# Patient Record
Sex: MALE | Race: WHITE | NOT HISPANIC OR LATINO | Employment: OTHER | URBAN - METROPOLITAN AREA
[De-identification: names, ages, dates, MRNs, and addresses within clinical notes are randomized per-mention and may not be internally consistent; named-entity substitution may affect disease eponyms.]

---

## 2020-04-15 ENCOUNTER — HOSPITAL ENCOUNTER (OUTPATIENT)
Dept: RADIOLOGY | Facility: HOSPITAL | Age: 65
Discharge: HOME | End: 2020-04-15
Attending: INTERNAL MEDICINE
Payer: MEDICARE

## 2020-04-15 ENCOUNTER — TRANSCRIBE ORDERS (OUTPATIENT)
Dept: SCHEDULING | Age: 65
End: 2020-04-15

## 2020-04-15 DIAGNOSIS — M47.816 SPONDYLOSIS WITHOUT MYELOPATHY OR RADICULOPATHY, LUMBAR REGION: ICD-10-CM

## 2020-04-15 DIAGNOSIS — M47.814 SPONDYLOSIS WITHOUT MYELOPATHY OR RADICULOPATHY, THORACIC REGION: Primary | ICD-10-CM

## 2020-04-15 DIAGNOSIS — M47.814 SPONDYLOSIS WITHOUT MYELOPATHY OR RADICULOPATHY, THORACIC REGION: ICD-10-CM

## 2020-04-15 PROCEDURE — 72070 X-RAY EXAM THORAC SPINE 2VWS: CPT

## 2020-04-15 PROCEDURE — 72100 X-RAY EXAM L-S SPINE 2/3 VWS: CPT

## 2020-06-22 ENCOUNTER — TRANSCRIBE ORDERS (OUTPATIENT)
Dept: SCHEDULING | Age: 65
End: 2020-06-22

## 2020-06-22 DIAGNOSIS — M47.814 SPONDYLOSIS WITHOUT MYELOPATHY OR RADICULOPATHY, THORACIC REGION: Primary | ICD-10-CM

## 2020-06-25 ENCOUNTER — HOSPITAL ENCOUNTER (OUTPATIENT)
Dept: RADIOLOGY | Facility: HOSPITAL | Age: 65
Discharge: HOME | End: 2020-06-25
Attending: INTERNAL MEDICINE
Payer: MEDICARE

## 2020-06-25 DIAGNOSIS — M47.814 SPONDYLOSIS WITHOUT MYELOPATHY OR RADICULOPATHY, THORACIC REGION: ICD-10-CM

## 2020-09-17 ENCOUNTER — APPOINTMENT (RX ONLY)
Dept: URBAN - METROPOLITAN AREA CLINIC 374 | Facility: CLINIC | Age: 65
Setting detail: DERMATOLOGY
End: 2020-09-17

## 2020-09-17 DIAGNOSIS — L64.8 OTHER ANDROGENIC ALOPECIA: ICD-10-CM

## 2020-09-17 DIAGNOSIS — L81.4 OTHER MELANIN HYPERPIGMENTATION: ICD-10-CM

## 2020-09-17 DIAGNOSIS — L20.89 OTHER ATOPIC DERMATITIS: ICD-10-CM

## 2020-09-17 DIAGNOSIS — D485 NEOPLASM OF UNCERTAIN BEHAVIOR OF SKIN: ICD-10-CM

## 2020-09-17 DIAGNOSIS — L82.1 OTHER SEBORRHEIC KERATOSIS: ICD-10-CM

## 2020-09-17 DIAGNOSIS — D22 MELANOCYTIC NEVI: ICD-10-CM

## 2020-09-17 DIAGNOSIS — Z71.89 OTHER SPECIFIED COUNSELING: ICD-10-CM

## 2020-09-17 DIAGNOSIS — D18.0 HEMANGIOMA: ICD-10-CM

## 2020-09-17 PROBLEM — D22.5 MELANOCYTIC NEVI OF TRUNK: Status: ACTIVE | Noted: 2020-09-17

## 2020-09-17 PROBLEM — D48.5 NEOPLASM OF UNCERTAIN BEHAVIOR OF SKIN: Status: ACTIVE | Noted: 2020-09-17

## 2020-09-17 PROBLEM — D18.01 HEMANGIOMA OF SKIN AND SUBCUTANEOUS TISSUE: Status: ACTIVE | Noted: 2020-09-17

## 2020-09-17 PROCEDURE — ? PHOTO-DOCUMENTATION

## 2020-09-17 PROCEDURE — ? BIOPSY BY SHAVE METHOD

## 2020-09-17 PROCEDURE — ? FULL BODY SKIN EXAM

## 2020-09-17 PROCEDURE — 11102 TANGNTL BX SKIN SINGLE LES: CPT

## 2020-09-17 PROCEDURE — ? PRESCRIPTION

## 2020-09-17 PROCEDURE — ? PRESCRIPTION MEDICATION MANAGEMENT

## 2020-09-17 PROCEDURE — ? COUNSELING

## 2020-09-17 PROCEDURE — 99214 OFFICE O/P EST MOD 30 MIN: CPT | Mod: 25

## 2020-09-17 RX ORDER — TRIAMCINOLONE ACETONIDE 1 MG/G
CREAM TOPICAL
Qty: 1 | Refills: 3 | Status: ERX | COMMUNITY
Start: 2020-09-17

## 2020-09-17 RX ORDER — FINASTERIDE 1 MG/1
TABLET, FILM COATED ORAL QD
Qty: 360 | Refills: 1

## 2020-09-17 RX ADMIN — TRIAMCINOLONE ACETONIDE: 1 CREAM TOPICAL at 00:00

## 2020-09-17 ASSESSMENT — LOCATION DETAILED DESCRIPTION DERM
LOCATION DETAILED: LEFT ANTERIOR PROXIMAL UPPER ARM
LOCATION DETAILED: LEFT ANTERIOR PROXIMAL THIGH
LOCATION DETAILED: RIGHT LATERAL SUPERIOR CHEST
LOCATION DETAILED: LEFT ANTERIOR SHOULDER
LOCATION DETAILED: LEFT LATERAL SUPERIOR CHEST
LOCATION DETAILED: RIGHT INFERIOR MEDIAL UPPER BACK
LOCATION DETAILED: LEFT PROXIMAL CALF
LOCATION DETAILED: POSTERIOR MID-PARIETAL SCALP
LOCATION DETAILED: EPIGASTRIC SKIN

## 2020-09-17 ASSESSMENT — LOCATION SIMPLE DESCRIPTION DERM
LOCATION SIMPLE: LEFT THIGH
LOCATION SIMPLE: ABDOMEN
LOCATION SIMPLE: RIGHT UPPER BACK
LOCATION SIMPLE: CHEST
LOCATION SIMPLE: LEFT SHOULDER
LOCATION SIMPLE: LEFT UPPER ARM
LOCATION SIMPLE: POSTERIOR SCALP
LOCATION SIMPLE: LEFT CALF

## 2020-09-17 ASSESSMENT — LOCATION ZONE DERM
LOCATION ZONE: TRUNK
LOCATION ZONE: ARM
LOCATION ZONE: SCALP
LOCATION ZONE: LEG

## 2020-09-17 NOTE — PROCEDURE: PRESCRIPTION MEDICATION MANAGEMENT
Continue Regimen: Propecia 1mg tablets qday
Detail Level: Zone
Render In Strict Bullet Format?: No
Initiate Treatment: triamcinolone acetonide 0.1 % topical cream: Apply to affected area of chest and arms bid ( do not use on face)

## 2020-09-17 NOTE — PROCEDURE: BIOPSY BY SHAVE METHOD

## 2021-03-18 ENCOUNTER — APPOINTMENT (RX ONLY)
Dept: URBAN - METROPOLITAN AREA CLINIC 374 | Facility: CLINIC | Age: 66
Setting detail: DERMATOLOGY
End: 2021-03-18

## 2021-03-18 DIAGNOSIS — L82.1 OTHER SEBORRHEIC KERATOSIS: ICD-10-CM

## 2021-03-18 DIAGNOSIS — Z71.89 OTHER SPECIFIED COUNSELING: ICD-10-CM

## 2021-03-18 DIAGNOSIS — L81.4 OTHER MELANIN HYPERPIGMENTATION: ICD-10-CM

## 2021-03-18 DIAGNOSIS — D22 MELANOCYTIC NEVI: ICD-10-CM

## 2021-03-18 DIAGNOSIS — D18.0 HEMANGIOMA: ICD-10-CM

## 2021-03-18 DIAGNOSIS — L57.0 ACTINIC KERATOSIS: ICD-10-CM

## 2021-03-18 PROBLEM — D18.01 HEMANGIOMA OF SKIN AND SUBCUTANEOUS TISSUE: Status: ACTIVE | Noted: 2021-03-18

## 2021-03-18 PROBLEM — D48.5 NEOPLASM OF UNCERTAIN BEHAVIOR OF SKIN: Status: ACTIVE | Noted: 2021-03-18

## 2021-03-18 PROBLEM — D22.5 MELANOCYTIC NEVI OF TRUNK: Status: ACTIVE | Noted: 2021-03-18

## 2021-03-18 PROCEDURE — ? COUNSELING

## 2021-03-18 PROCEDURE — 11102 TANGNTL BX SKIN SINGLE LES: CPT

## 2021-03-18 PROCEDURE — ? FULL BODY SKIN EXAM

## 2021-03-18 PROCEDURE — ? SUNSCREEN RECOMMENDATIONS

## 2021-03-18 PROCEDURE — ? PHOTO-DOCUMENTATION

## 2021-03-18 PROCEDURE — 17000 DESTRUCT PREMALG LESION: CPT | Mod: 59

## 2021-03-18 PROCEDURE — 17003 DESTRUCT PREMALG LES 2-14: CPT

## 2021-03-18 PROCEDURE — 99213 OFFICE O/P EST LOW 20 MIN: CPT | Mod: 25

## 2021-03-18 PROCEDURE — ? PREMALIGNANT DESTRUCTION

## 2021-03-18 PROCEDURE — ? BIOPSY BY SHAVE METHOD

## 2021-03-18 ASSESSMENT — LOCATION SIMPLE DESCRIPTION DERM
LOCATION SIMPLE: LEFT SCALP
LOCATION SIMPLE: SCALP
LOCATION SIMPLE: CHEST
LOCATION SIMPLE: RIGHT SCALP
LOCATION SIMPLE: LEFT BREAST
LOCATION SIMPLE: RIGHT UPPER BACK
LOCATION SIMPLE: ABDOMEN
LOCATION SIMPLE: LEFT THIGH

## 2021-03-18 ASSESSMENT — LOCATION DETAILED DESCRIPTION DERM
LOCATION DETAILED: RIGHT LATERAL SUPERIOR CHEST
LOCATION DETAILED: LEFT INFRAMAMMARY CREASE (OUTER QUADRANT)
LOCATION DETAILED: LEFT CENTRAL FRONTAL SCALP
LOCATION DETAILED: LEFT SUPERIOR PARIETAL SCALP
LOCATION DETAILED: RIGHT SUPERIOR PARIETAL SCALP
LOCATION DETAILED: LEFT CENTRAL PARIETAL SCALP
LOCATION DETAILED: RIGHT CENTRAL FRONTAL SCALP
LOCATION DETAILED: EPIGASTRIC SKIN
LOCATION DETAILED: RIGHT INFERIOR MEDIAL UPPER BACK
LOCATION DETAILED: LEFT ANTERIOR PROXIMAL THIGH

## 2021-03-18 ASSESSMENT — LOCATION ZONE DERM
LOCATION ZONE: LEG
LOCATION ZONE: TRUNK
LOCATION ZONE: SCALP

## 2021-03-18 NOTE — PROCEDURE: PREMALIGNANT DESTRUCTION
Detail Level: Zone
Total Number Of Aks Treated: 5
Method: liquid nitrogen
Post-Procedure Care (Optional): The patient received detailed post-op instructions.
Consent: The patient's consent was obtained including but not limited to risks of crusting, scabbing, blistering, scarring, darker or lighter pigmentary change, recurrence, incomplete removal and infection.
Post-Care Instructions: I reviewed with the patient in detail post-care instructions. Patient is to wear sunprotection, and avoid picking at any of the treated lesions. Pt may apply Vaseline to crusted or scabbing areas.
Render In Bullet Format When Appropriate: No

## 2021-03-18 NOTE — PROCEDURE: BIOPSY BY SHAVE METHOD

## 2021-11-18 ENCOUNTER — APPOINTMENT (RX ONLY)
Dept: URBAN - METROPOLITAN AREA CLINIC 374 | Facility: CLINIC | Age: 66
Setting detail: DERMATOLOGY
End: 2021-11-18

## 2021-11-18 DIAGNOSIS — L21.8 OTHER SEBORRHEIC DERMATITIS: ICD-10-CM | Status: STABLE

## 2021-11-18 DIAGNOSIS — D18.0 HEMANGIOMA: ICD-10-CM

## 2021-11-18 DIAGNOSIS — L81.4 OTHER MELANIN HYPERPIGMENTATION: ICD-10-CM

## 2021-11-18 DIAGNOSIS — Z71.89 OTHER SPECIFIED COUNSELING: ICD-10-CM

## 2021-11-18 DIAGNOSIS — L82.1 OTHER SEBORRHEIC KERATOSIS: ICD-10-CM

## 2021-11-18 DIAGNOSIS — D22 MELANOCYTIC NEVI: ICD-10-CM

## 2021-11-18 DIAGNOSIS — L57.0 ACTINIC KERATOSIS: ICD-10-CM

## 2021-11-18 PROBLEM — D22.62 MELANOCYTIC NEVI OF LEFT UPPER LIMB, INCLUDING SHOULDER: Status: ACTIVE | Noted: 2021-11-18

## 2021-11-18 PROBLEM — D22.71 MELANOCYTIC NEVI OF RIGHT LOWER LIMB, INCLUDING HIP: Status: ACTIVE | Noted: 2021-11-18

## 2021-11-18 PROBLEM — D22.72 MELANOCYTIC NEVI OF LEFT LOWER LIMB, INCLUDING HIP: Status: ACTIVE | Noted: 2021-11-18

## 2021-11-18 PROBLEM — D18.01 HEMANGIOMA OF SKIN AND SUBCUTANEOUS TISSUE: Status: ACTIVE | Noted: 2021-11-18

## 2021-11-18 PROBLEM — D22.5 MELANOCYTIC NEVI OF TRUNK: Status: ACTIVE | Noted: 2021-11-18

## 2021-11-18 PROBLEM — D22.61 MELANOCYTIC NEVI OF RIGHT UPPER LIMB, INCLUDING SHOULDER: Status: ACTIVE | Noted: 2021-11-18

## 2021-11-18 PROCEDURE — 99213 OFFICE O/P EST LOW 20 MIN: CPT | Mod: 25

## 2021-11-18 PROCEDURE — 17000 DESTRUCT PREMALG LESION: CPT

## 2021-11-18 PROCEDURE — 17003 DESTRUCT PREMALG LES 2-14: CPT

## 2021-11-18 PROCEDURE — ? COUNSELING

## 2021-11-18 PROCEDURE — ? SUNSCREEN RECOMMENDATIONS

## 2021-11-18 PROCEDURE — ? PREMALIGNANT DESTRUCTION

## 2021-11-18 PROCEDURE — ? FULL BODY SKIN EXAM

## 2021-11-18 PROCEDURE — ? TREATMENT REGIMEN

## 2021-11-18 ASSESSMENT — LOCATION DETAILED DESCRIPTION DERM
LOCATION DETAILED: RIGHT SUPERIOR MEDIAL UPPER BACK
LOCATION DETAILED: LEFT INFERIOR UPPER BACK
LOCATION DETAILED: LEFT INFERIOR HELIX
LOCATION DETAILED: INFERIOR THORACIC SPINE
LOCATION DETAILED: GLABELLA
LOCATION DETAILED: LEFT KNEE
LOCATION DETAILED: RIGHT ANTERIOR DISTAL THIGH
LOCATION DETAILED: RIGHT SUPERIOR CENTRAL MALAR CHEEK
LOCATION DETAILED: STERNUM
LOCATION DETAILED: LEFT ANTERIOR PROXIMAL THIGH
LOCATION DETAILED: RIGHT ANTERIOR DISTAL UPPER ARM
LOCATION DETAILED: RIGHT MEDIAL UPPER BACK
LOCATION DETAILED: LEFT ANTERIOR PROXIMAL UPPER ARM
LOCATION DETAILED: EPIGASTRIC SKIN
LOCATION DETAILED: POSTERIOR MID-PARIETAL SCALP
LOCATION DETAILED: RIGHT PROXIMAL PRETIBIAL REGION
LOCATION DETAILED: RIGHT ANTERIOR PROXIMAL UPPER ARM
LOCATION DETAILED: RIGHT CENTRAL FRONTAL SCALP
LOCATION DETAILED: LEFT ANTERIOR DISTAL UPPER ARM
LOCATION DETAILED: LEFT MID-UPPER BACK

## 2021-11-18 ASSESSMENT — LOCATION SIMPLE DESCRIPTION DERM
LOCATION SIMPLE: POSTERIOR SCALP
LOCATION SIMPLE: UPPER BACK
LOCATION SIMPLE: RIGHT CHEEK
LOCATION SIMPLE: RIGHT PRETIBIAL REGION
LOCATION SIMPLE: LEFT UPPER ARM
LOCATION SIMPLE: RIGHT UPPER ARM
LOCATION SIMPLE: RIGHT UPPER BACK
LOCATION SIMPLE: ABDOMEN
LOCATION SIMPLE: RIGHT THIGH
LOCATION SIMPLE: LEFT UPPER BACK
LOCATION SIMPLE: RIGHT SCALP
LOCATION SIMPLE: CHEST
LOCATION SIMPLE: LEFT EAR
LOCATION SIMPLE: GLABELLA
LOCATION SIMPLE: LEFT KNEE
LOCATION SIMPLE: LEFT THIGH

## 2021-11-18 ASSESSMENT — LOCATION ZONE DERM
LOCATION ZONE: FACE
LOCATION ZONE: EAR
LOCATION ZONE: TRUNK
LOCATION ZONE: SCALP
LOCATION ZONE: LEG
LOCATION ZONE: ARM

## 2021-11-18 NOTE — PROCEDURE: TREATMENT REGIMEN
Detail Level: Zone
Initiate Treatment: vanicream z-bar: wash face qday
Continue Regimen: OTC hydrocortisone cream prn

## 2021-11-18 NOTE — PROCEDURE: SUNSCREEN RECOMMENDATIONS
Face Mask
General Sunscreen Counseling: I recommended a broad spectrum sunscreen with a SPF of 30 or higher.  I explained that SPF 30 sunscreens block approximately 97 percent of the sun's harmful rays.  Sunscreens should be applied at least 15 minutes prior to expected sun exposure and then every 2 hours after that as long as sun exposure continues. If swimming or exercising sunscreen should be reapplied every 45 minutes to an hour after getting wet or sweating.  One ounce, or the equivalent of a shot glass full of sunscreen, is adequate to protect the skin not covered by a bathing suit. I also recommended a lip balm with a sunscreen as well. Sun protective clothing can be used in lieu of sunscreen but must be worn the entire time you are exposed to the sun's rays.
Detail Level: Zone

## 2021-11-18 NOTE — PROCEDURE: PREMALIGNANT DESTRUCTION
Post-Care Instructions: I reviewed with the patient in detail post-care instructions. Patient is to wear sunprotection, and avoid picking at any of the treated lesions. Pt may apply Vaseline to crusted or scabbing areas.
Render Post-Care In The Note: No
Consent: The patient's consent was obtained including but not limited to risks of crusting, scabbing, blistering, scarring, darker or lighter pigmentary change, recurrence, incomplete removal and infection.
Method: liquid nitrogen
Anesthesia Volume In Cc: 0.5
Detail Level: Detailed

## 2021-11-19 ENCOUNTER — RX ONLY (OUTPATIENT)
Age: 66
Setting detail: RX ONLY
End: 2021-11-19

## 2021-11-19 RX ORDER — FINASTERIDE 1 MG/1
TABLET, FILM COATED ORAL QDAY
Qty: 360 | Refills: 0 | Status: ERX | COMMUNITY
Start: 2021-11-19

## 2022-05-12 ENCOUNTER — APPOINTMENT (RX ONLY)
Dept: URBAN - METROPOLITAN AREA CLINIC 374 | Facility: CLINIC | Age: 67
Setting detail: DERMATOLOGY
End: 2022-05-12

## 2022-05-12 DIAGNOSIS — L21.8 OTHER SEBORRHEIC DERMATITIS: ICD-10-CM | Status: UNCHANGED

## 2022-05-12 DIAGNOSIS — Z71.89 OTHER SPECIFIED COUNSELING: ICD-10-CM

## 2022-05-12 DIAGNOSIS — L81.4 OTHER MELANIN HYPERPIGMENTATION: ICD-10-CM

## 2022-05-12 DIAGNOSIS — D22 MELANOCYTIC NEVI: ICD-10-CM

## 2022-05-12 DIAGNOSIS — L91.8 OTHER HYPERTROPHIC DISORDERS OF THE SKIN: ICD-10-CM

## 2022-05-12 DIAGNOSIS — D18.0 HEMANGIOMA: ICD-10-CM

## 2022-05-12 PROBLEM — D22.61 MELANOCYTIC NEVI OF RIGHT UPPER LIMB, INCLUDING SHOULDER: Status: ACTIVE | Noted: 2022-05-12

## 2022-05-12 PROBLEM — D18.01 HEMANGIOMA OF SKIN AND SUBCUTANEOUS TISSUE: Status: ACTIVE | Noted: 2022-05-12

## 2022-05-12 PROBLEM — D22.5 MELANOCYTIC NEVI OF TRUNK: Status: ACTIVE | Noted: 2022-05-12

## 2022-05-12 PROBLEM — D22.62 MELANOCYTIC NEVI OF LEFT UPPER LIMB, INCLUDING SHOULDER: Status: ACTIVE | Noted: 2022-05-12

## 2022-05-12 PROBLEM — D22.71 MELANOCYTIC NEVI OF RIGHT LOWER LIMB, INCLUDING HIP: Status: ACTIVE | Noted: 2022-05-12

## 2022-05-12 PROBLEM — D22.72 MELANOCYTIC NEVI OF LEFT LOWER LIMB, INCLUDING HIP: Status: ACTIVE | Noted: 2022-05-12

## 2022-05-12 PROCEDURE — ? SUNSCREEN RECOMMENDATIONS

## 2022-05-12 PROCEDURE — ? PRESCRIPTION

## 2022-05-12 PROCEDURE — 99214 OFFICE O/P EST MOD 30 MIN: CPT

## 2022-05-12 PROCEDURE — ? COUNSELING

## 2022-05-12 PROCEDURE — ? FULL BODY SKIN EXAM

## 2022-05-12 PROCEDURE — ? TREATMENT REGIMEN

## 2022-05-12 PROCEDURE — ? DEFER

## 2022-05-12 RX ORDER — PYRITHIONE ZINC 2 G/100G
SOAP TOPICAL QD
Qty: 1 | Refills: 3 | Status: ERX | COMMUNITY
Start: 2022-05-12

## 2022-05-12 RX ADMIN — PYRITHIONE ZINC: 2 SOAP TOPICAL at 00:00

## 2022-05-12 ASSESSMENT — LOCATION DETAILED DESCRIPTION DERM
LOCATION DETAILED: RIGHT ANTERIOR DISTAL UPPER ARM
LOCATION DETAILED: POSTERIOR MID-PARIETAL SCALP
LOCATION DETAILED: LEFT ANTERIOR PROXIMAL UPPER ARM
LOCATION DETAILED: GLABELLA
LOCATION DETAILED: LEFT KNEE
LOCATION DETAILED: RIGHT PROXIMAL PRETIBIAL REGION
LOCATION DETAILED: RIGHT ANTERIOR DISTAL THIGH
LOCATION DETAILED: RIGHT CLAVICULAR SKIN
LOCATION DETAILED: RIGHT SUPERIOR MEDIAL UPPER BACK
LOCATION DETAILED: LEFT MID-UPPER BACK
LOCATION DETAILED: LEFT INFERIOR HELIX
LOCATION DETAILED: LEFT CLAVICULAR NECK
LOCATION DETAILED: EPIGASTRIC SKIN
LOCATION DETAILED: RIGHT ANTERIOR PROXIMAL UPPER ARM
LOCATION DETAILED: INFERIOR THORACIC SPINE
LOCATION DETAILED: LEFT ANTERIOR PROXIMAL THIGH
LOCATION DETAILED: STERNUM
LOCATION DETAILED: LEFT ANTERIOR DISTAL UPPER ARM
LOCATION DETAILED: LEFT INFERIOR UPPER BACK

## 2022-05-12 ASSESSMENT — LOCATION ZONE DERM
LOCATION ZONE: NECK
LOCATION ZONE: LEG
LOCATION ZONE: FACE
LOCATION ZONE: EAR
LOCATION ZONE: SCALP
LOCATION ZONE: TRUNK
LOCATION ZONE: ARM

## 2022-05-12 ASSESSMENT — LOCATION SIMPLE DESCRIPTION DERM
LOCATION SIMPLE: POSTERIOR SCALP
LOCATION SIMPLE: RIGHT THIGH
LOCATION SIMPLE: ABDOMEN
LOCATION SIMPLE: LEFT KNEE
LOCATION SIMPLE: LEFT ANTERIOR NECK
LOCATION SIMPLE: RIGHT PRETIBIAL REGION
LOCATION SIMPLE: GLABELLA
LOCATION SIMPLE: RIGHT CLAVICULAR SKIN
LOCATION SIMPLE: LEFT UPPER BACK
LOCATION SIMPLE: CHEST
LOCATION SIMPLE: RIGHT UPPER BACK
LOCATION SIMPLE: LEFT EAR
LOCATION SIMPLE: LEFT UPPER ARM
LOCATION SIMPLE: LEFT THIGH
LOCATION SIMPLE: RIGHT UPPER ARM
LOCATION SIMPLE: UPPER BACK

## 2022-05-12 NOTE — PROCEDURE: TREATMENT REGIMEN
Detail Level: Zone
Initiate Treatment: vanicream z-bar: wash face qday (pt never got)
Discontinue Regimen: OTC hydrocortisone cream prn

## 2022-07-14 ENCOUNTER — APPOINTMENT (RX ONLY)
Dept: URBAN - METROPOLITAN AREA CLINIC 374 | Facility: CLINIC | Age: 67
Setting detail: DERMATOLOGY
End: 2022-07-14

## 2022-07-14 DIAGNOSIS — L21.8 OTHER SEBORRHEIC DERMATITIS: ICD-10-CM | Status: WELL CONTROLLED

## 2022-07-14 PROCEDURE — ? TREATMENT REGIMEN

## 2022-07-14 PROCEDURE — 99213 OFFICE O/P EST LOW 20 MIN: CPT

## 2022-07-14 PROCEDURE — ? ADDITIONAL NOTES

## 2022-07-14 PROCEDURE — ? PRESCRIPTION

## 2022-07-14 PROCEDURE — ? PRESCRIPTION MEDICATION MANAGEMENT

## 2022-07-14 PROCEDURE — ? COUNSELING

## 2022-07-14 RX ORDER — KETOCONAZOLE 20 MG/ML
1 SHAMPOO, SUSPENSION TOPICAL QDAY
Qty: 120 | Refills: 5 | Status: ERX | COMMUNITY
Start: 2022-07-14

## 2022-07-14 RX ADMIN — KETOCONAZOLE 1: 20 SHAMPOO, SUSPENSION TOPICAL at 00:00

## 2022-07-14 ASSESSMENT — LOCATION ZONE DERM
LOCATION ZONE: SCALP
LOCATION ZONE: FACE

## 2022-07-14 ASSESSMENT — LOCATION SIMPLE DESCRIPTION DERM
LOCATION SIMPLE: LEFT SCALP
LOCATION SIMPLE: GLABELLA

## 2022-07-14 ASSESSMENT — LOCATION DETAILED DESCRIPTION DERM
LOCATION DETAILED: LEFT MEDIAL FRONTAL SCALP
LOCATION DETAILED: GLABELLA

## 2022-07-14 NOTE — PROCEDURE: PRESCRIPTION MEDICATION MANAGEMENT
Continue Regimen: clobetasol 0.05% topical scalp solution: apply scattered drops to scalp PRN flare
Initiate Treatment: RESTART\\nketoconazole 2% shampoo: Wash scalp and face QDAY, let sit 3-5 minutes then rinse
Render In Strict Bullet Format?: No
Detail Level: Zone

## 2022-07-14 NOTE — PROCEDURE: TREATMENT REGIMEN
Detail Level: Zone
Continue Regimen: vanicream z-bar: wash face qday (pt never got)
Discontinue Regimen: OTC hydrocortisone cream prn

## 2022-07-14 NOTE — PROCEDURE: ADDITIONAL NOTES
Additional Notes: patient will decide if he likes the vanicream z-bar or keto shampoo better for washing face
Render Risk Assessment In Note?: yes
Detail Level: Zone

## 2022-07-15 RX ORDER — CLOBETASOL PROPIONATE 0.5 MG/ML
SOLUTION TOPICAL PRN
Qty: 50 | Refills: 1 | Status: ERX | COMMUNITY
Start: 2022-07-15

## 2022-07-15 RX ADMIN — CLOBETASOL PROPIONATE: 0.5 SOLUTION TOPICAL at 00:00

## 2022-09-27 ENCOUNTER — TRANSCRIBE ORDERS (OUTPATIENT)
Dept: SCHEDULING | Age: 67
End: 2022-09-27

## 2022-09-27 DIAGNOSIS — M54.50 LOW BACK PAIN, UNSPECIFIED: Primary | ICD-10-CM

## 2022-10-05 ENCOUNTER — HOSPITAL ENCOUNTER (OUTPATIENT)
Dept: RADIOLOGY | Facility: HOSPITAL | Age: 67
Discharge: HOME | End: 2022-10-05
Attending: PHYSICAL MEDICINE & REHABILITATION
Payer: MEDICARE

## 2022-10-05 ENCOUNTER — TRANSCRIBE ORDERS (OUTPATIENT)
Dept: REGISTRATION | Facility: HOSPITAL | Age: 67
End: 2022-10-05

## 2022-10-05 DIAGNOSIS — M25.60 STIFFNESS OF UNSPECIFIED JOINT, NOT ELSEWHERE CLASSIFIED: ICD-10-CM

## 2022-10-05 DIAGNOSIS — M51.34 OTHER INTERVERTEBRAL DISC DEGENERATION, THORACIC REGION: ICD-10-CM

## 2022-10-05 DIAGNOSIS — M54.6 PAIN IN THORACIC SPINE: ICD-10-CM

## 2022-10-05 DIAGNOSIS — M10.9 GOUT, UNSPECIFIED: ICD-10-CM

## 2022-10-05 DIAGNOSIS — M54.50 LOW BACK PAIN, UNSPECIFIED: ICD-10-CM

## 2022-10-05 DIAGNOSIS — M51.34 OTHER INTERVERTEBRAL DISC DEGENERATION, THORACIC REGION: Primary | ICD-10-CM

## 2022-10-05 PROCEDURE — 72110 X-RAY EXAM L-2 SPINE 4/>VWS: CPT

## 2022-10-14 ENCOUNTER — TRANSCRIBE ORDERS (OUTPATIENT)
Dept: RADIOLOGY | Age: 67
End: 2022-10-14

## 2022-10-14 ENCOUNTER — HOSPITAL ENCOUNTER (OUTPATIENT)
Dept: RADIOLOGY | Age: 67
Discharge: HOME | End: 2022-10-14
Attending: INTERNAL MEDICINE
Payer: MEDICARE

## 2022-10-14 DIAGNOSIS — M54.51 VERTEBROGENIC LOW BACK PAIN: ICD-10-CM

## 2022-10-14 DIAGNOSIS — M54.51 VERTEBROGENIC LOW BACK PAIN: Primary | ICD-10-CM

## 2022-10-14 PROCEDURE — 72202 X-RAY EXAM SI JOINTS 3/> VWS: CPT

## 2022-12-14 ENCOUNTER — RX ONLY (OUTPATIENT)
Age: 67
Setting detail: RX ONLY
End: 2022-12-14

## 2022-12-14 RX ORDER — FINASTERIDE 1 MG/1
TABLET, FILM COATED ORAL QDAY
Qty: 360 | Refills: 0 | Status: ERX

## 2022-12-16 ENCOUNTER — TRANSCRIBE ORDERS (OUTPATIENT)
Dept: SCHEDULING | Age: 67
End: 2022-12-16

## 2022-12-16 DIAGNOSIS — M46.00 SPINAL ENTHESOPATHY, SITE UNSPECIFIED (CMS/HCC): Primary | ICD-10-CM

## 2023-01-02 ENCOUNTER — TRANSCRIBE ORDERS (OUTPATIENT)
Dept: SCHEDULING | Age: 68
End: 2023-01-02

## 2023-01-02 DIAGNOSIS — G95.0 SYRINGOMYELIA AND SYRINGOBULBIA (CMS/HCC): Primary | ICD-10-CM

## 2023-01-05 ENCOUNTER — HOSPITAL ENCOUNTER (OUTPATIENT)
Dept: RADIOLOGY | Facility: HOSPITAL | Age: 68
Discharge: HOME | End: 2023-01-05
Attending: PHYSICAL MEDICINE & REHABILITATION
Payer: MEDICARE

## 2023-01-05 ENCOUNTER — HOSPITAL ENCOUNTER (EMERGENCY)
Facility: HOSPITAL | Age: 68
Discharge: LEFT BEFORE DISCHARGE | End: 2023-01-05
Payer: MEDICARE

## 2023-01-05 DIAGNOSIS — G95.0 SYRINGOMYELIA AND SYRINGOBULBIA (CMS/HCC): ICD-10-CM

## 2023-01-05 PROCEDURE — 99283 EMERGENCY DEPT VISIT LOW MDM: CPT

## 2023-01-18 ENCOUNTER — HOSPITAL ENCOUNTER (OUTPATIENT)
Dept: RADIOLOGY | Facility: HOSPITAL | Age: 68
Discharge: HOME | End: 2023-01-18
Attending: INTERNAL MEDICINE
Payer: MEDICARE

## 2023-01-18 DIAGNOSIS — M46.00 SPINAL ENTHESOPATHY, SITE UNSPECIFIED (CMS/HCC): ICD-10-CM

## 2023-01-18 RX ORDER — GADOBUTROL 604.72 MG/ML
0.1 INJECTION INTRAVENOUS ONCE
Status: COMPLETED | OUTPATIENT
Start: 2023-01-18 | End: 2023-01-18

## 2023-01-18 RX ADMIN — GADOBUTROL 10 ML: 604.72 INJECTION INTRAVENOUS at 13:45

## 2023-02-17 NOTE — PROCEDURE: FULL BODY SKIN EXAM
Price (Do Not Change): 0.00
Instructions: This plan will send the code FBSE to the PM system.  DO NOT or CHANGE the price.
Detail Level: Simple
Thalidomide Counseling: I discussed with the patient the risks of thalidomide including but not limited to birth defects, anxiety, weakness, chest pain, dizziness, cough and severe allergy.

## 2023-05-10 LAB
BASOPHILS # BLD AUTO: 0 X10E3/UL (ref 0–0.2)
BASOPHILS NFR BLD AUTO: 0 %
EOSINOPHIL # BLD AUTO: 0.1 X10E3/UL (ref 0–0.4)
EOSINOPHIL NFR BLD AUTO: 1 %
ERYTHROCYTE [DISTWIDTH] IN BLOOD BY AUTOMATED COUNT: 13.4 % (ref 11.6–15.4)
HCT VFR BLD AUTO: 50.9 % (ref 37.5–51)
HGB BLD-MCNC: 17.3 G/DL (ref 13–17.7)
IMM GRANULOCYTES # BLD AUTO: 0 X10E3/UL (ref 0–0.1)
IMM GRANULOCYTES NFR BLD AUTO: 0 %
LYMPHOCYTES # BLD AUTO: 3.2 X10E3/UL (ref 0.7–3.1)
LYMPHOCYTES NFR BLD AUTO: 38 %
MCH RBC QN AUTO: 31.3 PG (ref 26.6–33)
MCHC RBC AUTO-ENTMCNC: 34 G/DL (ref 31.5–35.7)
MCV RBC AUTO: 92 FL (ref 79–97)
MONOCYTES # BLD AUTO: 0.9 X10E3/UL (ref 0.1–0.9)
MONOCYTES NFR BLD AUTO: 11 %
NEUTROPHILS # BLD AUTO: 4.3 X10E3/UL (ref 1.4–7)
NEUTROPHILS NFR BLD AUTO: 50 %
PLATELET # BLD AUTO: 157 X10E3/UL (ref 150–450)
RBC # BLD AUTO: 5.52 X10E6/UL (ref 4.14–5.8)
WBC # BLD AUTO: 8.5 X10E3/UL (ref 3.4–10.8)

## 2023-05-11 LAB
ALBUMIN SERPL-MCNC: 4.8 G/DL (ref 3.8–4.8)
ALBUMIN/GLOB SERPL: 1.8 {RATIO} (ref 1.2–2.2)
ALP SERPL-CCNC: 73 IU/L (ref 44–121)
ALT SERPL-CCNC: 31 IU/L (ref 0–44)
AST SERPL-CCNC: 29 IU/L (ref 0–40)
BILIRUB SERPL-MCNC: 0.5 MG/DL (ref 0–1.2)
BUN SERPL-MCNC: 18 MG/DL (ref 8–27)
BUN/CREAT SERPL: 18 (ref 10–24)
CALCIUM SERPL-MCNC: 10.2 MG/DL (ref 8.6–10.2)
CHLORIDE SERPL-SCNC: 98 MMOL/L (ref 96–106)
CO2 SERPL-SCNC: 25 MMOL/L (ref 20–29)
CREAT SERPL-MCNC: 0.99 MG/DL (ref 0.76–1.27)
EGFRCR SERPLBLD CKD-EPI 2021: 83 ML/MIN/1.73
GLOBULIN SER CALC-MCNC: 2.6 G/DL (ref 1.5–4.5)
GLUCOSE SERPL-MCNC: 91 MG/DL (ref 70–99)
POTASSIUM SERPL-SCNC: 4.9 MMOL/L (ref 3.5–5.2)
PROT SERPL-MCNC: 7.4 G/DL (ref 6–8.5)
SODIUM SERPL-SCNC: 138 MMOL/L (ref 134–144)
URATE SERPL-MCNC: 5.6 MG/DL (ref 3.8–8.4)

## 2023-09-27 ENCOUNTER — APPOINTMENT (RX ONLY)
Dept: URBAN - METROPOLITAN AREA CLINIC 374 | Facility: CLINIC | Age: 68
Setting detail: DERMATOLOGY
End: 2023-09-27

## 2023-09-27 DIAGNOSIS — Z71.89 OTHER SPECIFIED COUNSELING: ICD-10-CM

## 2023-09-27 DIAGNOSIS — L81.4 OTHER MELANIN HYPERPIGMENTATION: ICD-10-CM

## 2023-09-27 DIAGNOSIS — D22 MELANOCYTIC NEVI: ICD-10-CM

## 2023-09-27 DIAGNOSIS — D18.0 HEMANGIOMA: ICD-10-CM

## 2023-09-27 DIAGNOSIS — L21.8 OTHER SEBORRHEIC DERMATITIS: ICD-10-CM | Status: IMPROVED

## 2023-09-27 PROBLEM — D22.5 MELANOCYTIC NEVI OF TRUNK: Status: ACTIVE | Noted: 2023-09-27

## 2023-09-27 PROBLEM — D22.71 MELANOCYTIC NEVI OF RIGHT LOWER LIMB, INCLUDING HIP: Status: ACTIVE | Noted: 2023-09-27

## 2023-09-27 PROBLEM — D22.62 MELANOCYTIC NEVI OF LEFT UPPER LIMB, INCLUDING SHOULDER: Status: ACTIVE | Noted: 2023-09-27

## 2023-09-27 PROBLEM — D18.01 HEMANGIOMA OF SKIN AND SUBCUTANEOUS TISSUE: Status: ACTIVE | Noted: 2023-09-27

## 2023-09-27 PROBLEM — D22.72 MELANOCYTIC NEVI OF LEFT LOWER LIMB, INCLUDING HIP: Status: ACTIVE | Noted: 2023-09-27

## 2023-09-27 PROBLEM — D22.61 MELANOCYTIC NEVI OF RIGHT UPPER LIMB, INCLUDING SHOULDER: Status: ACTIVE | Noted: 2023-09-27

## 2023-09-27 PROCEDURE — 99213 OFFICE O/P EST LOW 20 MIN: CPT

## 2023-09-27 PROCEDURE — ? FULL BODY SKIN EXAM

## 2023-09-27 PROCEDURE — ? SUNSCREEN RECOMMENDATIONS

## 2023-09-27 PROCEDURE — ? TREATMENT REGIMEN

## 2023-09-27 PROCEDURE — ? PRESCRIPTION

## 2023-09-27 PROCEDURE — ? COUNSELING

## 2023-09-27 RX ORDER — TRIAMCINOLONE ACETONIDE 1 MG/G
1 CREAM TOPICAL PRN
Qty: 454 | Refills: 3 | Status: ERX

## 2023-09-27 ASSESSMENT — LOCATION DETAILED DESCRIPTION DERM
LOCATION DETAILED: LEFT ANTERIOR DISTAL UPPER ARM
LOCATION DETAILED: RIGHT ANTERIOR DISTAL THIGH
LOCATION DETAILED: LEFT MID-UPPER BACK
LOCATION DETAILED: LEFT INFERIOR HELIX
LOCATION DETAILED: EPIGASTRIC SKIN
LOCATION DETAILED: RIGHT SUPERIOR MEDIAL UPPER BACK
LOCATION DETAILED: POSTERIOR MID-PARIETAL SCALP
LOCATION DETAILED: GLABELLA
LOCATION DETAILED: RIGHT ANTERIOR PROXIMAL UPPER ARM
LOCATION DETAILED: INFERIOR THORACIC SPINE
LOCATION DETAILED: LEFT ANTERIOR PROXIMAL THIGH
LOCATION DETAILED: LEFT ANTERIOR PROXIMAL UPPER ARM
LOCATION DETAILED: STERNUM
LOCATION DETAILED: RIGHT ANTERIOR DISTAL UPPER ARM
LOCATION DETAILED: LEFT INFERIOR UPPER BACK
LOCATION DETAILED: RIGHT PROXIMAL PRETIBIAL REGION
LOCATION DETAILED: LEFT KNEE

## 2023-09-27 ASSESSMENT — LOCATION SIMPLE DESCRIPTION DERM
LOCATION SIMPLE: LEFT THIGH
LOCATION SIMPLE: LEFT UPPER BACK
LOCATION SIMPLE: RIGHT THIGH
LOCATION SIMPLE: RIGHT UPPER ARM
LOCATION SIMPLE: POSTERIOR SCALP
LOCATION SIMPLE: LEFT UPPER ARM
LOCATION SIMPLE: UPPER BACK
LOCATION SIMPLE: LEFT KNEE
LOCATION SIMPLE: RIGHT PRETIBIAL REGION
LOCATION SIMPLE: ABDOMEN
LOCATION SIMPLE: RIGHT UPPER BACK
LOCATION SIMPLE: GLABELLA
LOCATION SIMPLE: LEFT EAR
LOCATION SIMPLE: CHEST

## 2023-09-27 ASSESSMENT — LOCATION ZONE DERM
LOCATION ZONE: SCALP
LOCATION ZONE: EAR
LOCATION ZONE: LEG
LOCATION ZONE: ARM
LOCATION ZONE: TRUNK
LOCATION ZONE: FACE

## 2023-09-27 NOTE — PROCEDURE: TREATMENT REGIMEN
Detail Level: Zone
Initiate Treatment: triamcinolone acetonide 0.1% topical cream: Apply to AA of back and scalp QDay PRN itch
Continue Regimen: vanicream z-bar: wash face qday

## 2024-01-17 ENCOUNTER — TRANSCRIBE ORDERS (OUTPATIENT)
Dept: SCHEDULING | Age: 69
End: 2024-01-17

## 2024-01-17 DIAGNOSIS — M47.817 SPONDYLOSIS WITHOUT MYELOPATHY OR RADICULOPATHY, LUMBOSACRAL REGION: Primary | ICD-10-CM

## 2024-02-17 ENCOUNTER — HOSPITAL ENCOUNTER (OUTPATIENT)
Dept: RADIOLOGY | Facility: HOSPITAL | Age: 69
Discharge: HOME | End: 2024-02-17
Attending: PHYSICAL MEDICINE & REHABILITATION
Payer: MEDICARE

## 2024-02-17 DIAGNOSIS — M47.817 SPONDYLOSIS WITHOUT MYELOPATHY OR RADICULOPATHY, LUMBOSACRAL REGION: ICD-10-CM

## 2024-02-22 ENCOUNTER — OFFICE VISIT (OUTPATIENT)
Dept: PHYSICAL MEDICINE AND REHAB | Facility: HOSPITAL | Age: 69
End: 2024-02-22
Attending: PHYSICAL MEDICINE & REHABILITATION
Payer: MEDICARE

## 2024-02-22 DIAGNOSIS — M47.817 LUMBOSACRAL SPONDYLOSIS WITHOUT MYELOPATHY: Primary | ICD-10-CM

## 2024-02-22 PROCEDURE — 95910 NRV CNDJ TEST 7-8 STUDIES: CPT

## 2024-02-22 PROCEDURE — 95886 MUSC TEST DONE W/N TEST COMP: CPT

## 2024-02-22 NOTE — PROGRESS NOTES
Tomas Taylor is a 68 y.o.  male  who presents to the EMG lab today for evaluation of paresthesias in his bilateral anterior thighs.  Patient has a past medical history of multilevels lumbar spinal stenosis with radiculopathy s/p L4-5 decompression.  He underwent abdominal surgery in [place] 2 months ago for what sounds like lysis of adhesions in the setting of SBO, and since then, he has had paresthesias in his anterior thighs.    NCS/EMG findings  Bilateral sural sensory, superficial peroneal sensory NCS is unremarkable.  Bilateral tibial and peroneal motor NCS is unremarkable.  Bilateral needle EMG to abductor longus, VMO, tibialis anterior, peroneal longus, medial head of the gastroc is notable for increased motor unit action potentials with mild to moderately reduced recruitment.    Impression  1.  There is electrodiagnostic evidence of chronic L2-S1 lumbar radiculopathy without evidence of acute denervation.  2.  There is no convincing electrodiagnostic evidence for peroneal neuropathy across the fibular head.    Recommendation  Mr. Taylor was informed of the results of study and recommended to follow back up with Dr. Wang.    Please contact me with any questions,   Mary Anne Abel, DO

## 2024-04-09 ENCOUNTER — TRANSCRIBE ORDERS (OUTPATIENT)
Dept: SCHEDULING | Age: 69
End: 2024-04-09

## 2024-04-09 DIAGNOSIS — M25.512 PAIN IN LEFT SHOULDER: Primary | ICD-10-CM

## 2024-04-20 ENCOUNTER — HOSPITAL ENCOUNTER (OUTPATIENT)
Dept: RADIOLOGY | Facility: HOSPITAL | Age: 69
Discharge: HOME | End: 2024-04-20
Attending: PHYSICAL MEDICINE & REHABILITATION
Payer: MEDICARE

## 2024-04-20 DIAGNOSIS — M25.512 PAIN IN LEFT SHOULDER: ICD-10-CM

## 2024-07-23 ENCOUNTER — OFFICE VISIT (OUTPATIENT)
Dept: INTERNAL MEDICINE | Facility: CLINIC | Age: 69
End: 2024-07-23
Payer: MEDICARE

## 2024-07-23 VITALS
SYSTOLIC BLOOD PRESSURE: 134 MMHG | TEMPERATURE: 98.1 F | RESPIRATION RATE: 16 BRPM | DIASTOLIC BLOOD PRESSURE: 88 MMHG | OXYGEN SATURATION: 99 % | HEIGHT: 70 IN | BODY MASS INDEX: 30.78 KG/M2 | HEART RATE: 123 BPM | WEIGHT: 215 LBS

## 2024-07-23 DIAGNOSIS — Z11.59 NEED FOR HEPATITIS B SCREENING TEST: ICD-10-CM

## 2024-07-23 DIAGNOSIS — Z11.59 NEED FOR HEPATITIS C SCREENING TEST: ICD-10-CM

## 2024-07-23 DIAGNOSIS — R01.1 MURMUR: ICD-10-CM

## 2024-07-23 DIAGNOSIS — M19.90 ARTHRITIS: ICD-10-CM

## 2024-07-23 DIAGNOSIS — G57.20 ANTERIOR FEMORAL CUTANEOUS NEUROPATHY, UNSPECIFIED LATERALITY: ICD-10-CM

## 2024-07-23 DIAGNOSIS — R53.83 FATIGUE, UNSPECIFIED TYPE: ICD-10-CM

## 2024-07-23 DIAGNOSIS — Z11.4 SCREENING FOR HIV (HUMAN IMMUNODEFICIENCY VIRUS): ICD-10-CM

## 2024-07-23 DIAGNOSIS — Z13.1 SCREENING FOR DIABETES MELLITUS: ICD-10-CM

## 2024-07-23 DIAGNOSIS — R49.0 HOARSENESS OF VOICE: ICD-10-CM

## 2024-07-23 DIAGNOSIS — Z13.220 LIPID SCREENING: ICD-10-CM

## 2024-07-23 DIAGNOSIS — M62.08 DIASTASIS RECTI: ICD-10-CM

## 2024-07-23 DIAGNOSIS — G95.0 SYRINGOMYELIA AND SYRINGOBULBIA (CMS/HCC): ICD-10-CM

## 2024-07-23 DIAGNOSIS — R03.0 ELEVATED BLOOD PRESSURE READING: Primary | ICD-10-CM

## 2024-07-23 DIAGNOSIS — R00.0 TACHYCARDIA: ICD-10-CM

## 2024-07-23 DIAGNOSIS — M46.00 SPINAL ENTHESOPATHY, SITE UNSPECIFIED (CMS/HCC): ICD-10-CM

## 2024-07-23 PROBLEM — K63.5 POLYP OF COLON: Status: ACTIVE | Noted: 2021-08-11

## 2024-07-23 PROBLEM — E66.9 OBESITY WITH BODY MASS INDEX 30 OR GREATER: Status: ACTIVE | Noted: 2020-10-28

## 2024-07-23 PROBLEM — E29.1 MALE HYPOGONADISM: Status: ACTIVE | Noted: 2019-05-13

## 2024-07-23 PROBLEM — N39.9 UROLOGIC DISORDER: Status: ACTIVE | Noted: 2018-07-24

## 2024-07-23 PROBLEM — M10.9 GOUT: Status: ACTIVE | Noted: 2024-07-23

## 2024-07-23 PROCEDURE — 93000 ELECTROCARDIOGRAM COMPLETE: CPT | Performed by: STUDENT IN AN ORGANIZED HEALTH CARE EDUCATION/TRAINING PROGRAM

## 2024-07-23 PROCEDURE — 99205 OFFICE O/P NEW HI 60 MIN: CPT | Performed by: STUDENT IN AN ORGANIZED HEALTH CARE EDUCATION/TRAINING PROGRAM

## 2024-07-23 PROCEDURE — G2211 COMPLEX E/M VISIT ADD ON: HCPCS | Performed by: STUDENT IN AN ORGANIZED HEALTH CARE EDUCATION/TRAINING PROGRAM

## 2024-07-23 RX ORDER — FINASTERIDE 1 MG/1
1 TABLET, FILM COATED ORAL DAILY
COMMUNITY

## 2024-07-23 RX ORDER — PREGABALIN 50 MG/1
50 CAPSULE ORAL 2 TIMES DAILY
COMMUNITY
End: 2025-03-27 | Stop reason: ALTCHOICE

## 2024-07-23 RX ORDER — ALLOPURINOL 300 MG/1
300 TABLET ORAL DAILY
COMMUNITY
Start: 2024-06-11

## 2024-07-23 RX ORDER — DULOXETIN HYDROCHLORIDE 30 MG/1
30 CAPSULE, DELAYED RELEASE ORAL DAILY
Qty: 90 CAPSULE | Refills: 3 | Status: SHIPPED | OUTPATIENT
Start: 2024-07-23 | End: 2024-09-10

## 2024-07-23 ASSESSMENT — ENCOUNTER SYMPTOMS
VOMITING: 0
DIAPHORESIS: 0
HEADACHES: 0
DIARRHEA: 0
UNEXPECTED WEIGHT CHANGE: 0
DYSPHORIC MOOD: 0
PALPITATIONS: 0
NECK PAIN: 0
JOINT SWELLING: 0
NERVOUS/ANXIOUS: 0
POLYDIPSIA: 0
EYE REDNESS: 0
FEVER: 0
SHORTNESS OF BREATH: 0
COUGH: 0
POLYPHAGIA: 0
BLOOD IN STOOL: 0
NECK STIFFNESS: 0
MYALGIAS: 0
BACK PAIN: 1
VOICE CHANGE: 0
NAUSEA: 0
WEAKNESS: 0
SORE THROAT: 0
DIZZINESS: 0
LIGHT-HEADEDNESS: 0

## 2024-07-23 ASSESSMENT — PATIENT HEALTH QUESTIONNAIRE - PHQ9: SUM OF ALL RESPONSES TO PHQ9 QUESTIONS 1 & 2: 0

## 2024-07-23 NOTE — PROGRESS NOTES
NEW PATIENT    [facility] in [place]  Dr. Keshawn Douglas D.O.    Phone: [phone]  Fax: [phone]      HISTORY OF PRESENT ILLNESS        Establish [facility]       Patient is an 69 y.o. male who presents on 7/23/2024 as new patient to [facility].    History of Present Illness    Patient is following with rheumatology, Dr.Tanya De La Rosa:  Plan of Treatment  #Scalp psoriasis  #Positive FE  #Degenerative disc disease, lumbar and thoracic spine c/b radiculopathy  Patient with scalp psoriasis and what initially sounded to me like inflammatory back pain (prolonged AM stiffness, improvement with movement) warranted a work up for seronegative inflammatory arthritis. MRI of the lumbar spine revealed significant degenerative disc disease with bulging discs and positive SLR on examination. Additional work up with MRI pelvis did not reveal any inflammation as well. He has undergone injections by orthopedics with improvement in his symptoms and my suspicion for inflammatory axial disease is no longer present. He also had EMG with radiculopathy - appreciate orthopedic follow up  - continue to follow with orthopedics spine for continued injections  - continue with shampoo for scalp lesions and appreciate dermatology follow up  - Positive FE is likely in the setting of psoriasis, no evidence of underlying CTD.    #Gout  Patient had presented to me with frequent flares on low dose of allopurinol. Uric acid was above goal and was started on colchicine and allopurinol was increased. Uric acid in 03/2023 was below goal, however, still with flares so will increase allopurinol and continue with colchicine.  - Increase allopurinol to 300mg daily  - decrease colchicine to EOD dosing  - Check uric acid and CMP in 2-3 weeks  - finished steroids for recent flare 2 days ago     Arthritis  Torn rotator cuff  MRI 2/17/2024 showed degenerative disc disease  - follows with orthopedics Dr. Wang  -- going to  "PT, starts tomorrow for L shoulder  - worst pain is the back pain  - tried pregabalin and it didn't help, takes 150mg daily  - has not tried duloxetine  - has numbness in the lateral thighs    In December 14 was in [place]  - vomiting for 1 week  - had laparoscopic procedure  -- in [place] \"bridenileus, koprostase, exsikkose\"  --- GI atony    Following with Urology Dr. Buzz Umaña  - Hypogonadism/low testosterone  - AndroGel 1%  - last checked 2 weeks ago    - Nocturia  - Gemtesa  - thought to be due to BPH, tried flomax and finasteride and cialis, tried 6 different medications    Diastasis Recti  - Patient believes that when he used to do 500 crunches in 8 min that he gave himself diastases recti  - Nonpainful central bulge present for years    Obesity  - battling weight his entire life     Colon cancer screening  - colonoscopy 3 weeks prior  - Follows with provider at [facility] Dr. Benitez Vazquez    Prostate cancer screening  - follows with urology    Elevated BP  - usually 130/70    Diet: 2 eggs each morning  Activity: exercise  Drugs: denies  ETOH: rarely  Occupation:     Other Providers caring for patient:   Patient Care Team     Relationship Specialty Notifications Start End   Keshawn Douglas DO PCP - General Family Medicine Admissions 7/23/24     Address:  [address] [place] [address] [facility] [address]        PHQ2:   Over the past 2 weeks, have you felt down, depressed or hopeless? no  Over the past 2 weeks, have you felt little interest or pleasure in doing things? no     Health Maintenance   Topic Date Due    Colorectal Cancer Screening  Never done    Medicare Annual Wellness Visit  Never done    Hepatitis C Screening  Never done    DTaP, Tdap, and Td Vaccines (1 - Tdap) Never done    RSV (60+ years old [shared decision making] or in pregnancy during 32 through 36 weeks) (1 - 1-dose 60+ series) Never done    Zoster Vaccine (3 of 3) 01/03/2020    Pneumococcal (65 years and older) (2 of 2 - PCV) 12/11/2021    " COVID-19 Vaccine (7 - 2023-24 season) 02/02/2024    Influenza Vaccine (1) 08/01/2024    Depression Screening  07/23/2025    Falls Risk Screening  07/23/2025    Meningococcal ACWY  Aged Out    RSV <20 months  Aged Out    HIB Vaccines  Aged Out    Hepatitis B Vaccines  Aged Out    IPV Vaccines  Aged Out    HPV Vaccines  Aged Out       Other screenings not listed above:    Below was reviewed by me on the day of the visit.  PAST MEDICAL AND SURGICAL HISTORY        History reviewed. No pertinent past medical history.    History reviewed. No pertinent surgical history.  MEDICATIONS          Current Outpatient Medications:     allopurinoL (ZYLOPRIM) 300 mg tablet, Take 300 mg by mouth daily., Disp: , Rfl:     DULoxetine (CYMBALTA) 30 mg capsule, Take 1 capsule (30 mg total) by mouth daily., Disp: 90 capsule, Rfl: 3    finasteride (PROPECIA) 1 mg tablet, Take 1 mg by mouth daily., Disp: , Rfl:     pregabalin (LYRICA) 50 mg capsule, Take 50 mg by mouth 2 (two) times a day., Disp: , Rfl:     MITIGARE 0.6 mg capsule, Take 0.6 mg by mouth daily., Disp: , Rfl:     pantoprazole (PROTONIX) 40 mg EC tablet, Take 40 mg by mouth daily., Disp: , Rfl:     testosterone (ANDROGEL) 1 % (50 mg/5 gram) gel in packet, APPLY 1 PACKET EVERY DAY BY TRANSDERMAL ROUTE, Disp: , Rfl:     vibegron (GEMTESA) 75 mg tablet, Gemtesa 75 mg tablet  Take 1 tablet every day by oral route., Disp: , Rfl:   ALLERGIES        Simvastatin and Penicillins  FAMILY HISTORY        History reviewed. No pertinent family history.  SOCIAL/ TOBACCO HISTORY        Social History     Tobacco Use    Smoking status: Never    Smokeless tobacco: Never     REVIEW OF SYSTEMS        Review of Systems   Constitutional:  Negative for diaphoresis, fever and unexpected weight change.   HENT:  Negative for sore throat, tinnitus and voice change.    Eyes:  Negative for redness and visual disturbance.   Respiratory:  Negative for cough and shortness of breath.    Cardiovascular:   "Negative for chest pain, palpitations and leg swelling.   Gastrointestinal:  Negative for blood in stool, diarrhea, nausea and vomiting.   Endocrine: Negative for polydipsia, polyphagia and polyuria.   Musculoskeletal:  Positive for back pain. Negative for gait problem, joint swelling, myalgias, neck pain and neck stiffness.   Skin:  Negative for pallor and rash.   Neurological:  Negative for dizziness, syncope, weakness, light-headedness and headaches.   Psychiatric/Behavioral:  Negative for dysphoric mood. The patient is not nervous/anxious.       PHYSICAL EXAMINATION      Visit Vitals  /88 (BP Location: Left upper arm)   Pulse (!) 123   Temp 36.7 °C (98.1 °F) (Oral)   Resp 16   Ht 1.778 m (5' 10\")   Wt 97.5 kg (215 lb)   SpO2 99%   BMI 30.85 kg/m²      Body mass index is 30.85 kg/m².  Wt Readings from Last 3 Encounters:   07/23/24 97.5 kg (215 lb)   04/20/24 95.7 kg (211 lb)   02/17/24 99.8 kg (220 lb)        No results found.      Physical Exam  Constitutional:       General: He is not in acute distress.     Appearance: Normal appearance. He is not ill-appearing, toxic-appearing or diaphoretic.   HENT:      Head: Normocephalic and atraumatic.      Right Ear: Tympanic membrane, ear canal and external ear normal. There is no impacted cerumen.      Left Ear: Tympanic membrane, ear canal and external ear normal. There is no impacted cerumen.      Nose: Nose normal.      Mouth/Throat:      Mouth: Mucous membranes are moist.      Pharynx: Oropharynx is clear.   Eyes:      General: No scleral icterus.     Extraocular Movements: Extraocular movements intact.      Conjunctiva/sclera: Conjunctivae normal.      Pupils: Pupils are equal, round, and reactive to light.   Cardiovascular:      Rate and Rhythm: Normal rate and regular rhythm.      Pulses: Normal pulses.      Heart sounds: Normal heart sounds. No murmur heard.     No friction rub. No gallop.   Pulmonary:      Effort: Pulmonary effort is normal.      Breath " "sounds: Normal breath sounds. No wheezing or rales.   Abdominal:      General: Bowel sounds are normal.      Palpations: Abdomen is soft.      Tenderness: There is no guarding or rebound.      Hernia: A hernia is present.      Comments: Large central abdominal hernia parent with flexion of the torso.  Resolves with relaxation of the abdominal wall musculature.   Musculoskeletal:         General: No deformity or signs of injury.      Cervical back: Normal range of motion and neck supple.   Skin:     General: Skin is warm and dry.      Capillary Refill: Capillary refill takes less than 2 seconds.   Neurological:      General: No focal deficit present.      Mental Status: He is alert and oriented to person, place, and time.   Psychiatric:         Mood and Affect: Mood normal.         Behavior: Behavior normal.       ECG 12 LEAD OFFICE PERFORMED    Date/Time: 7/23/2024 6:20 PM    Performed by: Keshawn Douglas DO  Authorized by: Keshawn Douglas DO    Previous ECG:     Previous ECG:  Unavailable  Interpretation:     Interpretation: abnormal    Rate:     ECG rate:  115    ECG rate assessment: tachycardic    Rhythm:     Rhythm: sinus rhythm    Ectopy:     Ectopy: PVCs    QRS:     QRS axis:  Normal    QRS intervals:  Normal    QRS conduction: normal    ST segments:     ST segments:  Normal  T waves:     T waves: normal    Q waves:     Abnormal Q-waves: not present        PRIOR LABS        No results found for: \"HGBA1C\"  No results found for: \"CHOL\"  No results found for: \"HDL\"  No results found for: \"LDLCALC\"  No results found for: \"TRIG\"  No results found for: \"CHOLHDL\"  No results found for: \"TSH\"  Lab Results   Component Value Date    WBC 8.5 05/10/2023    HGB 17.3 05/10/2023    HCT 50.9 05/10/2023    MCV 92 05/10/2023     05/10/2023     Lab Results   Component Value Date     05/10/2023    K 4.9 05/10/2023    CL 98 05/10/2023    CO2 25 05/10/2023    BUN 18 05/10/2023    CREATININE 0.99 05/10/2023    " GLUCOSE 91 05/10/2023    AST 29 05/10/2023    ALT 31 05/10/2023    ALKPHOS 73 05/10/2023    PROTEIN 7.4 05/10/2023    ALBUMIN 4.8 05/10/2023    BILITOT 0.5 05/10/2023    EGFR 83 05/10/2023       ASSESSMENT AND PLAN     Assessment/Plan     Problem List Items Addressed This Visit          Nervous    Syringomyelia and syringobulbia (CMS/HCC)     Continue to follow with orthopedics.  Start Cymbalta for pain control.         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  May have some sensation return over time.  Patient may also be prone to compression neuropathies as he has something similar in the right leg.  Continue to monitor.  Avoid keeping wallet in the front pocket.  Weight loss would probably improve symptoms.         Relevant Medications    finasteride (PROPECIA) 1 mg tablet       Circulatory    Elevated blood pressure reading - Primary     Pt to start home BP monitoring. Reviewed [facility] how to take home BP video. Recommended OMRON BP cuff.    EKG significant for tachycardia and mild ectopy, no acute strings of prior strain or infarction.    Given murmur and tachycardia would like to obtain echocardiogram before initiating hypertensive management.         Relevant Orders    TSH w reflex FT4    Murmur     Unclear murmur best heard left upper sternal border minimal radiation to the carotids.  Obtain echo for additional evaluation         Relevant Orders    Transthoracic echo (TTE) complete    ECG 12 LEAD OFFICE PERFORMED (Completed)       Digestive    Diastasis recti     Referred to general surgery for additional evaluation         Relevant Orders    Ambulatory referral to General Surgery       Musculoskeletal    Spinal enthesopathy, site unspecified (CMS/HCC)    Arthritis     After discussing the risk and benefits of multiple medications will initiate Cymbalta uptitrated to 60 mg after 4 weeks for pain control          Relevant Medications    DULoxetine (CYMBALTA) 30 mg capsule       Other    Hoarseness of voice     Refer to ENT to evaluate rule out malignancy         Relevant Orders    Ambulatory referral to ENT     Other Visit Diagnoses       Screening for HIV (human immunodeficiency virus)        Relevant Orders    HIV 1,2 AB P24 AG    Lipid screening        Relevant Orders    Lipid panel    Screening for diabetes mellitus        Relevant Orders    Comprehensive metabolic panel    Hemoglobin A1c    Need for hepatitis C screening test        Relevant Orders    Hepatitis C antibody    Need for hepatitis B screening test        Relevant Orders    Hepatitis B core antibody, total    Hepatitis B surface antigen    Hepatitis B surface antibody    Fatigue, unspecified type        Relevant Orders    TSH w reflex FT4    CBC and differential    Tachycardia              60min spent on this date of service performing the following activities: obtaining history, documenting, preparing for visit, obtaining / reviewing records, providing counseling and education, independently reviewing study/studies, communicating results and coordinating care.    I attest that this visit supports the complexity inherent to evaluation and management associated with medical care services that serve as the continuing focal point for all needed health care services and/or medical care services that are part of ongoing care related to this patient's single, serious condition or a complex condition.      Return in about 4 weeks (around 8/20/2024).    Keshawn Douglas, DO     7/23/2024

## 2024-07-23 NOTE — ASSESSMENT & PLAN NOTE
Unclear murmur best heard left upper sternal border minimal radiation to the carotids.  Obtain echo for additional evaluation

## 2024-07-23 NOTE — ASSESSMENT & PLAN NOTE
Pt to start home BP monitoring. Reviewed [facility] how to take home BP video. Recommended OMRON BP cuff.    EKG significant for tachycardia and mild ectopy, no acute strings of prior strain or infarction.    Given murmur and tachycardia would like to obtain echocardiogram before initiating hypertensive management.

## 2024-07-23 NOTE — ASSESSMENT & PLAN NOTE
After discussing the risk and benefits of multiple medications will initiate Cymbalta uptitrated to 60 mg after 4 weeks for pain control

## 2024-07-23 NOTE — ASSESSMENT & PLAN NOTE
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  May have some sensation return over time.  Patient may also be prone to compression neuropathies as he has something similar in the right leg.  Continue to monitor.  Avoid keeping wallet in the front pocket.  Weight loss would probably improve symptoms.

## 2024-07-25 LAB
BASOPHILS # BLD AUTO: 0 X10E3/UL (ref 0–0.2)
BASOPHILS NFR BLD AUTO: 1 %
EOSINOPHIL # BLD AUTO: 0.2 X10E3/UL (ref 0–0.4)
EOSINOPHIL NFR BLD AUTO: 3 %
ERYTHROCYTE [DISTWIDTH] IN BLOOD BY AUTOMATED COUNT: 15.6 % (ref 11.6–15.4)
HBA1C MFR BLD: 5.7 % (ref 4.8–5.6)
HCT VFR BLD AUTO: 48 % (ref 37.5–51)
HGB BLD-MCNC: 15.5 G/DL (ref 13–17.7)
IMM GRANULOCYTES # BLD AUTO: 0.1 X10E3/UL (ref 0–0.1)
IMM GRANULOCYTES NFR BLD AUTO: 1 %
LYMPHOCYTES # BLD AUTO: 2.5 X10E3/UL (ref 0.7–3.1)
LYMPHOCYTES NFR BLD AUTO: 40 %
MCH RBC QN AUTO: 29.3 PG (ref 26.6–33)
MCHC RBC AUTO-ENTMCNC: 32.3 G/DL (ref 31.5–35.7)
MCV RBC AUTO: 91 FL (ref 79–97)
MONOCYTES # BLD AUTO: 0.6 X10E3/UL (ref 0.1–0.9)
MONOCYTES NFR BLD AUTO: 10 %
NEUTROPHILS # BLD AUTO: 2.8 X10E3/UL (ref 1.4–7)
NEUTROPHILS NFR BLD AUTO: 45 %
PLATELET # BLD AUTO: 198 X10E3/UL (ref 150–450)
RBC # BLD AUTO: 5.29 X10E6/UL (ref 4.14–5.8)
WBC # BLD AUTO: 6.3 X10E3/UL (ref 3.4–10.8)

## 2024-07-26 ENCOUNTER — TELEPHONE (OUTPATIENT)
Dept: INTERNAL MEDICINE | Facility: CLINIC | Age: 69
End: 2024-07-26
Payer: MEDICARE

## 2024-07-26 LAB
ALBUMIN SERPL-MCNC: 4.4 G/DL (ref 3.9–4.9)
ALP SERPL-CCNC: 89 IU/L (ref 44–121)
ALT SERPL-CCNC: 19 IU/L (ref 0–44)
AST SERPL-CCNC: 20 IU/L (ref 0–40)
BILIRUB SERPL-MCNC: 0.4 MG/DL (ref 0–1.2)
BUN SERPL-MCNC: 22 MG/DL (ref 8–27)
BUN/CREAT SERPL: 23 (ref 10–24)
CALCIUM SERPL-MCNC: 10.2 MG/DL (ref 8.6–10.2)
CHLORIDE SERPL-SCNC: 103 MMOL/L (ref 96–106)
CHOLEST SERPL-MCNC: 226 MG/DL (ref 100–199)
CO2 SERPL-SCNC: 24 MMOL/L (ref 20–29)
CREAT SERPL-MCNC: 0.96 MG/DL (ref 0.76–1.27)
EGFRCR SERPLBLD CKD-EPI 2021: 85 ML/MIN/1.73
GLOBULIN SER CALC-MCNC: 2.9 G/DL (ref 1.5–4.5)
GLUCOSE SERPL-MCNC: 90 MG/DL (ref 70–99)
HBV CORE AB SERPL QL IA: NEGATIVE
HBV CORE IGM SERPL QL IA: NEGATIVE
HBV SURFACE AB SER QL: NON REACTIVE
HBV SURFACE AG SERPL QL IA: NEGATIVE
HCV IGG SERPL QL IA: NON REACTIVE
HDLC SERPL-MCNC: 42 MG/DL
HIV 1+2 AB+HIV1 P24 AG SERPL QL IA: NON REACTIVE
LDLC SERPL CALC-MCNC: 136 MG/DL (ref 0–99)
POTASSIUM SERPL-SCNC: 4.7 MMOL/L (ref 3.5–5.2)
PROT SERPL-MCNC: 7.3 G/DL (ref 6–8.5)
SODIUM SERPL-SCNC: 140 MMOL/L (ref 134–144)
T4 FREE SERPL-MCNC: 1.14 NG/DL (ref 0.82–1.77)
TRIGL SERPL-MCNC: 266 MG/DL (ref 0–149)
TSH SERPL DL<=0.005 MIU/L-ACNC: 3.75 UIU/ML (ref 0.45–4.5)
VLDLC SERPL CALC-MCNC: 48 MG/DL (ref 5–40)

## 2024-09-10 ENCOUNTER — OFFICE VISIT (OUTPATIENT)
Dept: INTERNAL MEDICINE | Facility: CLINIC | Age: 69
End: 2024-09-10
Payer: MEDICARE

## 2024-09-10 VITALS
HEIGHT: 70 IN | OXYGEN SATURATION: 99 % | HEART RATE: 125 BPM | TEMPERATURE: 97.8 F | WEIGHT: 224 LBS | DIASTOLIC BLOOD PRESSURE: 88 MMHG | BODY MASS INDEX: 32.07 KG/M2 | RESPIRATION RATE: 16 BRPM | SYSTOLIC BLOOD PRESSURE: 137 MMHG

## 2024-09-10 DIAGNOSIS — I10 PRIMARY HYPERTENSION: ICD-10-CM

## 2024-09-10 DIAGNOSIS — E78.2 MIXED HYPERLIPIDEMIA: ICD-10-CM

## 2024-09-10 DIAGNOSIS — M62.08 DIASTASIS RECTI: ICD-10-CM

## 2024-09-10 DIAGNOSIS — G57.20 ANTERIOR FEMORAL CUTANEOUS NEUROPATHY, UNSPECIFIED LATERALITY: ICD-10-CM

## 2024-09-10 DIAGNOSIS — R01.1 MURMUR: Primary | ICD-10-CM

## 2024-09-10 PROCEDURE — G8754 DIAS BP LESS 90: HCPCS | Performed by: STUDENT IN AN ORGANIZED HEALTH CARE EDUCATION/TRAINING PROGRAM

## 2024-09-10 PROCEDURE — 99214 OFFICE O/P EST MOD 30 MIN: CPT | Performed by: STUDENT IN AN ORGANIZED HEALTH CARE EDUCATION/TRAINING PROGRAM

## 2024-09-10 PROCEDURE — G8752 SYS BP LESS 140: HCPCS | Performed by: STUDENT IN AN ORGANIZED HEALTH CARE EDUCATION/TRAINING PROGRAM

## 2024-09-10 ASSESSMENT — ENCOUNTER SYMPTOMS
UNEXPECTED WEIGHT CHANGE: 0
NAUSEA: 0
EYE REDNESS: 0
NECK PAIN: 0
DIAPHORESIS: 0
LIGHT-HEADEDNESS: 0
DIZZINESS: 0
POLYDIPSIA: 0
HEADACHES: 0
DYSPHORIC MOOD: 0
COUGH: 0
BLOOD IN STOOL: 0
SORE THROAT: 0
VOICE CHANGE: 0
FEVER: 0
DIARRHEA: 0
NERVOUS/ANXIOUS: 0
NECK STIFFNESS: 0
WEAKNESS: 0
SHORTNESS OF BREATH: 0
POLYPHAGIA: 0
VOMITING: 0
PALPITATIONS: 0

## 2024-09-10 ASSESSMENT — PATIENT HEALTH QUESTIONNAIRE - PHQ9: SUM OF ALL RESPONSES TO PHQ9 QUESTIONS 1 & 2: 0

## 2024-09-10 NOTE — PROGRESS NOTES
[facility] in [place]  Dr. Keshawn Douglas    Phone: [phone]  Fax: [phone]           Patient ID: Tomas Taylor                              : 1955    Visit Date: 9/10/2024    Chief Complaint: Follow-up      HPI    Patient ID: Tomas Taylor is a 69 y.o. male who presents for follow-up.    Elevated ASCVD risk  - had severe pain with statins  - tried both lovastatin and simvastatin    Duloxetine - pt reports trying it in the past and it didn't help. May it harder for him to reach orgasm.     Patient is following with rheumatology, Dr.Tanya De La Rosa:  Plan of Treatment  #Scalp psoriasis  #Positive FE  #Degenerative disc disease, lumbar and thoracic spine c/b radiculopathy  Patient with scalp psoriasis and what initially sounded to me like inflammatory back pain (prolonged AM stiffness, improvement with movement) warranted a work up for seronegative inflammatory arthritis. MRI of the lumbar spine revealed significant degenerative disc disease with bulging discs and positive SLR on examination. Additional work up with MRI pelvis did not reveal any inflammation as well. He has undergone injections by orthopedics with improvement in his symptoms and my suspicion for inflammatory axial disease is no longer present. He also had EMG with radiculopathy - appreciate orthopedic follow up  - continue to follow with orthopedics spine for continued injections  - continue with shampoo for scalp lesions and appreciate dermatology follow up  - Positive FE is likely in the setting of psoriasis, no evidence of underlying CTD.    #Gout  Patient had presented to me with frequent flares on low dose of allopurinol. Uric acid was above goal and was started on colchicine and allopurinol was increased. Uric acid in 2023 was below goal, however, still with flares so will increase allopurinol and continue with colchicine.  - Increase allopurinol to 400mg daily  - decrease  "colchicine to EOD dosing  - Check uric acid and CMP in 2-3 weeks  - finished steroids for recent flare 2 days ago      Arthritis  Torn rotator cuff  MRI 2/17/2024 showed degenerative disc disease  - follows with orthopedics Dr. Wang  -- going to PT, starts tomorrow for L shoulder  - worst pain is the back pain  - tried pregabalin and it didn't help, takes 150mg daily  - has not tried duloxetine    Numbness in the lateral thighs  - In December 14 was in [place]  - vomiting for 1 week  - had laparoscopic procedure  -- in [place] \"bridenileus, koprostase, exsikkose\"  --- GI atony     Following with Urology Dr. Buzz Umaña  - Hypogonadism/low testosterone  - AndroGel 1%  - last checked 2 weeks ago     - Nocturia  - Gemtesa  - thought to be due to BPH, tried flomax and finasteride and cialis, tried 6 different medications     Diastasis Recti  - Patient believes that when he used to do 500 crunches in 8 min that he gave himself diastases recti  - Nonpainful central bulge present for years     Obesity  - battling weight his entire life      Colon cancer screening  - colonoscopy 3 weeks prior  - Follows with provider at [facility] Dr. Benitez Vazquez     Prostate cancer screening  - follows with urology     Elevated BP  - usually 130/70    The following have been reviewed and updated as appropriate in this visit:    Current Outpatient Medications:     allopurinoL (ZYLOPRIM) 300 mg tablet, Take 300 mg by mouth daily., Disp: , Rfl:     finasteride (PROPECIA) 1 mg tablet, Take 1 mg by mouth daily., Disp: , Rfl:     MITIGARE 0.6 mg capsule, Take 0.6 mg by mouth daily., Disp: , Rfl:     pantoprazole (PROTONIX) 40 mg EC tablet, Take 40 mg by mouth daily., Disp: , Rfl:     pregabalin (LYRICA) 50 mg capsule, Take 50 mg by mouth 2 (two) times a day., Disp: , Rfl:     testosterone (ANDROGEL) 1 % (50 mg/5 gram) gel in packet, APPLY 1 PACKET EVERY DAY BY TRANSDERMAL ROUTE, Disp: , Rfl:     vibegron (GEMTESA) 75 mg tablet, Gemtesa 75 mg tablet  " Take 1 tablet every day by oral route., Disp: , Rfl:     Allergies   Allergen Reactions    Simvastatin Other (see comments)    Penicillins Other (see comments)     RASH         Health Maintenance   Topic Date Due    Colorectal Cancer Screening  Never done    Medicare Annual Wellness Visit  Never done    DTaP, Tdap, and Td Vaccines (1 - Tdap) Never done    RSV (60+ years old [shared decision making] or in pregnancy during 32 through 36 weeks) (1 - 1-dose 60+ series) Never done    Zoster Vaccine (3 of 3) 01/03/2020    Pneumococcal (65 years and older) (2 of 2 - PCV) 12/11/2021    Influenza Vaccine (1) 08/01/2024    COVID-19 Vaccine (7 - 2023-24 season) 09/01/2024    Depression Screening  09/10/2025    Falls Risk Screening  09/10/2025    Hepatitis C Screening  Completed    Meningococcal ACWY  Aged Out    RSV <20 months  Aged Out    HIB Vaccines  Aged Out    Hepatitis B Vaccines  Aged Out    IPV Vaccines  Aged Out    HPV Vaccines  Aged Out       Other screenings not listed above:    Review of Systems   Constitutional:  Negative for diaphoresis, fever and unexpected weight change.   HENT:  Negative for sore throat, tinnitus and voice change.    Eyes:  Negative for redness and visual disturbance.   Respiratory:  Negative for cough and shortness of breath.    Cardiovascular:  Negative for chest pain, palpitations and leg swelling.   Gastrointestinal:  Negative for blood in stool, diarrhea, nausea and vomiting.   Endocrine: Negative for polydipsia, polyphagia and polyuria.   Musculoskeletal:  Negative for neck pain and neck stiffness.   Skin:  Negative for pallor and rash.   Neurological:  Negative for dizziness, syncope, weakness, light-headedness and headaches.   Psychiatric/Behavioral:  Negative for dysphoric mood. The patient is not nervous/anxious.      Rest of ROS as above    Objective:    Vitals:    09/10/24 1708   BP: 137/88   Pulse: (!) 125   Resp: 16   Temp: 36.6 °C (97.8 °F)   TempSrc: Oral   SpO2: 99%   Weight:  "102 kg (224 lb)   Height: 1.778 m (5' 10\")       Body mass index is 32.14 kg/m².  Wt Readings from Last 3 Encounters:   09/10/24 102 kg (224 lb)   07/23/24 97.5 kg (215 lb)   04/20/24 95.7 kg (211 lb)        Physical Exam  Constitutional:       General: He is not in acute distress.     Appearance: Normal appearance. He is not ill-appearing, toxic-appearing or diaphoretic.   HENT:      Head: Normocephalic and atraumatic.      Nose: Nose normal.      Mouth/Throat:      Mouth: Mucous membranes are moist.      Pharynx: Oropharynx is clear.   Eyes:      Extraocular Movements: Extraocular movements intact.      Pupils: Pupils are equal, round, and reactive to light.   Cardiovascular:      Rate and Rhythm: Normal rate and regular rhythm.      Pulses: Normal pulses.      Heart sounds: Murmur heard.      No gallop.   Pulmonary:      Effort: Pulmonary effort is normal.      Breath sounds: Normal breath sounds.   Skin:     Capillary Refill: Capillary refill takes less than 2 seconds.   Neurological:      General: No focal deficit present.      Mental Status: He is alert and oriented to person, place, and time.   Psychiatric:         Mood and Affect: Mood normal.         Behavior: Behavior normal.           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  May have some sensation return over time.  Patient may also be prone to compression neuropathies as he has something similar in the right leg.  Continue to monitor.  Avoid keeping wallet in the front pocket.  Weight loss would probably improve symptoms.            Circulatory    Primary hypertension     Encouraged restarting exercise. Follow-up 3 months to recheck BP. Goal BP <130/80. Start ARB if not at goal. Avoid phentermine for weight loss.         Murmur - Primary     Unclear murmur " best heard left upper sternal border minimal radiation to the carotids.  Obtain echo for additional evaluation            Digestive    Diastasis recti     Referred to general surgery for additional evaluation            Other    Mixed hyperlipidemia     Elevated ASCVD risk to 23%, could not tolerate 2 statins. Will obtain CACS if plaque will start zetia and refer to cardiology.         Relevant Orders    CT HEART CORONARY ARTERY CALCIUM SCORE WITHOUT IV CONTRAST       Return in about 3 months (around 12/10/2024).       9/10/2024

## 2024-09-10 NOTE — ASSESSMENT & PLAN NOTE
Elevated ASCVD risk to 23%, could not tolerate 2 statins. Will obtain CACS if plaque will start zetia and refer to cardiology.

## 2024-09-10 NOTE — ASSESSMENT & PLAN NOTE
Encouraged restarting exercise. Follow-up 3 months to recheck BP. Goal BP <130/80. Start ARB if not at goal. Avoid phentermine for weight loss.

## 2024-11-13 ENCOUNTER — HOSPITAL ENCOUNTER (OUTPATIENT)
Dept: CARDIOLOGY | Facility: HOSPITAL | Age: 69
Discharge: HOME | End: 2024-11-13
Attending: STUDENT IN AN ORGANIZED HEALTH CARE EDUCATION/TRAINING PROGRAM
Payer: MEDICARE

## 2024-11-13 VITALS
HEIGHT: 70 IN | BODY MASS INDEX: 32.07 KG/M2 | SYSTOLIC BLOOD PRESSURE: 162 MMHG | DIASTOLIC BLOOD PRESSURE: 79 MMHG | WEIGHT: 224 LBS

## 2024-11-13 DIAGNOSIS — R01.1 MURMUR: ICD-10-CM

## 2024-11-13 LAB
AORTIC ROOT ANNULUS: 3.9 CM
AORTIC VALVE MEAN VELOCITY: 1.51 M/S
AORTIC VALVE VELOCITY TIME INTEGRAL: 32.7 CM
AV MEAN GRADIENT: 11 MMHG
AV PEAK GRADIENT: 19 MMHG
AV PEAK VELOCITY-S: 2.17 M/S
AV VALVE AREA INDEX: 0.67
AV VALVE AREA: 0.94 CM2
AV VELOCITY RATIO: 0.48
AVA (VTI): 1.5 CM2
BSA FOR ECHO PROCEDURE: 2.24 M2
DOP CALC LVOT STROKE VOLUME: 48.98 CM3
E WAVE DECELERATION TIME: 77 MS
E/A RATIO: 0.7
E/E' RATIO: 7.1
E/LAT E' RATIO: 8.2
EDV (BP): 66.4 CM3
EF (A4C): 61.1 %
EF A2C: 46.5 %
EJECTION FRACTION: 54.5 %
ESV (BP): 30.2 CM3
FRACTIONAL SHORTENING: 25.53 %
INTERVENTRICULAR SEPTUM: 1.17 CM
LA ESV (BP): 42.6 CM3
LA ESV INDEX (A2C): 25.4 CM3/M2
LA ESV INDEX (BP): 19.02 CM3/M2
LA/AORTA RATIO: 0.85
LAAS-AP2: 18.9 CM2
LAAS-AP4: 16 CM2
LAD 2D: 3.3 CM
LALD A4C: 5.2 CM
LALD A4C: 5.4 CM
LAV-S: 56.9 CM3
LEFT ATRIUM VOLUME INDEX: 13.97 CM3/M2
LEFT ATRIUM VOLUME: 31.3 CM3
LEFT INTERNAL DIMENSION IN SYSTOLE: 3.18 CM (ref 3.66–5.54)
LEFT VENTRICLE DIASTOLIC VOLUME INDEX: 32.5 CM3/M2
LEFT VENTRICLE DIASTOLIC VOLUME: 72.8 CM3
LEFT VENTRICLE SYSTOLIC VOLUME INDEX: 12.63 CM3/M2
LEFT VENTRICLE SYSTOLIC VOLUME: 28.3 CM3
LEFT VENTRICULAR INTERNAL DIMENSION IN DIASTOLE: 4.27 CM (ref 6.28–8.72)
LEFT VENTRICULAR POSTERIOR WALL IN END DIASTOLE: 1.46 CM (ref 0.77–1.44)
LV DIASTOLIC VOLUME: 58.3 CM3
LV ESV (APICAL 2 CHAMBER): 31.2 CM3
LVAD-AP2: 22.5 CM2
LVAD-AP4: 24.7 CM2
LVAS-AP2: 14.2 CM2
LVAS-AP4: 14 CM2
LVEDVI(A2C): 26.03 CM3/M2
LVEDVI(BP): 29.64 CM3/M2
LVESVI(A2C): 13.93 CM3/M2
LVESVI(BP): 13.48 CM3/M2
LVLD-AP2: 7.2 CM
LVLD-AP4: 6.86 CM
LVLS-AP2: 5.64 CM
LVLS-AP4: 5.86 CM
LVOT 2D: 2 CM
LVOT A: 3.14 CM2
LVOT MG: 1 MMHG
LVOT MV: 0.53 M/S
LVOT PEAK VELOCITY: 0.83 M/S
LVOT PG: 3 MMHG
LVOT STROKE VOLUME INDEX: 21.87 ML/M2
LVOT VTI: 15.6 CM
MLH CV ECHO AVA INDEX VELOCITY RATIO: 0.4
MV E'TISSUE VEL-LAT: 0.1 M/S
MV E'TISSUE VEL-MED: 0.11 M/S
MV PEAK A VEL: 1.13 M/S
MV PEAK E VEL: 0.81 M/S
MV STENOSIS PRESSURE HALF TIME: 23 MS
MV VALVE AREA P 1/2 METHOD: 9.57 CM2
POSTERIOR WALL: 1.46 CM
Z-SCORE OF LEFT VENTRICULAR DIMENSION IN END DIASTOLE: -5.5
Z-SCORE OF LEFT VENTRICULAR DIMENSION IN END SYSTOLE: -2.74
Z-SCORE OF LEFT VENTRICULAR POSTERIOR WALL IN END DIASTOLE: 1.69

## 2024-11-13 PROCEDURE — 93306 TTE W/DOPPLER COMPLETE: CPT

## 2024-11-13 PROCEDURE — 93306 TTE W/DOPPLER COMPLETE: CPT | Mod: 26 | Performed by: INTERNAL MEDICINE

## 2024-11-15 ENCOUNTER — TRANSCRIBE ORDERS (OUTPATIENT)
Dept: SCHEDULING | Age: 69
End: 2024-11-15

## 2024-11-15 DIAGNOSIS — M17.11 UNILATERAL PRIMARY OSTEOARTHRITIS, RIGHT KNEE: Primary | ICD-10-CM

## 2024-11-26 ENCOUNTER — TELEPHONE (OUTPATIENT)
Dept: INTERNAL MEDICINE | Facility: CLINIC | Age: 69
End: 2024-11-26
Payer: MEDICARE

## 2024-11-26 ENCOUNTER — HOSPITAL ENCOUNTER (OUTPATIENT)
Dept: RADIOLOGY | Age: 69
Discharge: HOME | End: 2024-11-26
Attending: STUDENT IN AN ORGANIZED HEALTH CARE EDUCATION/TRAINING PROGRAM
Payer: MEDICARE

## 2024-11-26 DIAGNOSIS — E78.2 MIXED HYPERLIPIDEMIA: ICD-10-CM

## 2024-11-26 DIAGNOSIS — R93.1 ELEVATED CORONARY ARTERY CALCIUM SCORE: Primary | ICD-10-CM

## 2024-11-26 PROCEDURE — 75571 CT HRT W/O DYE W/CA TEST: CPT

## 2024-11-26 NOTE — TELEPHONE ENCOUNTER
[facility] Appointment Request   Provider: Keshawn Douglas  Appointment Type: Office Visit  Reason for Visit: Review test results  Available Day and Time: anytime in November or December  Best Contact Number: [phone]  Patient is requesting appointment to review recent test results with physician. Currently, no times populating until February. Patient is requesting sooner appointment, if possible, but only with physician.     The practice will reach out to schedule your appointment within the next 2 business days.

## 2024-12-02 ENCOUNTER — HOSPITAL ENCOUNTER (OUTPATIENT)
Dept: RADIOLOGY | Facility: HOSPITAL | Age: 69
Discharge: HOME | End: 2024-12-02
Attending: PHYSICAL MEDICINE & REHABILITATION
Payer: MEDICARE

## 2024-12-02 DIAGNOSIS — M17.11 UNILATERAL PRIMARY OSTEOARTHRITIS, RIGHT KNEE: ICD-10-CM

## 2024-12-04 ENCOUNTER — TRANSCRIBE ORDERS (OUTPATIENT)
Dept: RADIOLOGY | Age: 69
End: 2024-12-04

## 2024-12-04 ENCOUNTER — HOSPITAL ENCOUNTER (OUTPATIENT)
Dept: RADIOLOGY | Age: 69
Discharge: HOME | End: 2024-12-04
Attending: PHYSICAL MEDICINE & REHABILITATION
Payer: MEDICARE

## 2024-12-04 DIAGNOSIS — M17.11 UNILATERAL PRIMARY OSTEOARTHRITIS, RIGHT KNEE: ICD-10-CM

## 2024-12-04 DIAGNOSIS — M17.11 UNILATERAL PRIMARY OSTEOARTHRITIS, RIGHT KNEE: Primary | ICD-10-CM

## 2024-12-04 PROCEDURE — 73564 X-RAY EXAM KNEE 4 OR MORE: CPT | Mod: RT

## 2025-03-26 NOTE — PROGRESS NOTES
Cardiology  Office Consult Note         Reason for visit:   Chief Complaint   Patient presents with    Establish Care       HPI     Tomas Taylor is a 69 y.o. male who presents to the office for cardiovascular evaluation.    He was referred by his PCP, Dr. Douglas for elevated CACS.     Echocardiogram 11/13/24    CT Heart Coronary Calcium Score 11/26/24   CACS 1573 (, , RCA 94)    AHA Life Essential 8s   Diet: A pamphlet from the National Lipid Association with lifestyle tips to decrease the LDL cholesterol was given to patient.      Activity: He is not currently exercising    Sleep: Discussed with patient about obstructive sleep apnea, he will let me know if he wants to have a sleep study  Weight: Stable  Cholesterol: 7/25/24 FLP: , , HDL 42,   Diabetes: 7/25/24 Hgb A1c: 5.7   Blood pressure: BP elevated in the office. He has an arm BP cuff at home but does not monitor his BP regularly.  We discussed about the guideline recommended goal systolic blood pressure less than 120 mmHg and diastolic blood pressure less than 80 mmHg. We discussed about the proper way of checking the blood pressure and a video from the American Heart Association was shown to patient.  Patient will monitor the blood pressure and let me know.  Smoking: Never smoked     Atherosclerosis counseling:   I had an extensive discussion with patient about the process of atherosclerosis and ways of preventing cardiovascular events. We discussed about lifestyle changes, including regular exercise and a cardiac diet, we discussed about cardiac risk factors and risk factor modification, including family history of early coronary artery disease, high blood pressure, high cholesterol, inactivity, obesity, smoking, sleep apnea, inflammatory conditions, alcohol abuse; and we discussed about medications.      Anticoagulation:   Patient takes 3 x 81 mg aspirin daily.  I discussed with him my concern for risk of  bleeding, he agrees to decrease to 2 x 81 mg aspirin daily.  I advised him to avoid NSAIDs    Past Medical History:   Diagnosis Date    Lipid disorder        Past Surgical History   Procedure Laterality Date    Abdominal surgery  12/2023    Back surgery  05/19/1999    Tonsillectomy      at age 4        Social History     Tobacco Use   Smoking Status Never   Smokeless Tobacco Never     Social History     Tobacco Use    Smoking status: Never    Smokeless tobacco: Never   Vaping Use    Vaping status: Never Used   Substance Use Topics    Alcohol use: Yes     Comment: rarely    Drug use: Never       Family History   Problem Relation Name Age of Onset    Heart attack Biological Mother  60    Heart attack Biological Father          x2 , age 50 and 72    Heart attack Father's Brother         Allergies:  Simvastatin and Penicillins    Current Outpatient Medications   Medication Sig Dispense Refill    allopurinoL (ZYLOPRIM) 300 mg tablet Take 300 mg by mouth daily. (Patient taking differently: Take 300 mg by mouth as needed.)      evolocumab (REPATHA SURECLICK) 140 mg/mL pen Inject 1 mL (140 mg total) under the skin every 14 (fourteen) days. 6 mL 3    finasteride (PROPECIA) 1 mg tablet Take 1 mg by mouth daily.      MITIGARE 0.6 mg capsule Take 0.6 mg by mouth daily. (Patient taking differently: Take 0.6 mg by mouth as needed.)      mv-min/folic/K1/lycopen/lutein (MEN 50 PLUS MULTIVITAMIN ORAL) Take 1 tablet by mouth once daily.      pantoprazole (PROTONIX) 40 mg EC tablet Take 40 mg by mouth daily. (Patient taking differently: Take 40 mg by mouth as needed.)      testosterone (ANDROGEL) 1 % (50 mg/5 gram) gel in packet APPLY 1 PACKET EVERY DAY BY TRANSDERMAL ROUTE (Patient taking differently: Place 50 mg on the skin daily.)      vibegron (GEMTESA) 75 mg tablet Gemtesa 75 mg tablet   Take 1 tablet every day by oral route.       No current facility-administered medications for this visit.       Medication changes:       ROS He  comes in for an initial evaluation. He denies any chest pain, palpitations, shortness of breath, LE edema, dizziness, or lightheadedness.     Objective     Vitals:    03/27/25 1404   BP: (!) 144/85   Pulse: 100   SpO2: 95%       Wt Readings from Last 1 Encounters:   03/27/25 103 kg (228 lb)       Body surface area is 2.26 meters squared.    Body mass index is 32.71 kg/m².    Physical Exam  Constitutional:       Appearance: Normal appearance.   Neck:      Vascular: No carotid bruit or JVD.   Cardiovascular:      Rate and Rhythm: Normal rate and regular rhythm. Extrasystoles are present.     Pulses: Normal pulses.      Heart sounds: Murmur (2/6 ANGELICA) heard.   Pulmonary:      Effort: Pulmonary effort is normal. No respiratory distress.      Breath sounds: Normal breath sounds.   Musculoskeletal:      Right lower leg: No edema.      Left lower leg: No edema.   Skin:     General: Skin is warm and dry.   Neurological:      General: No focal deficit present.      Mental Status: He is alert and oriented to person, place, and time.           ECG   Sinus rhythm with occasional Premature ventricular complexes  Nonspecific ST and T wave abnormality  Abnormal ECG  No previous ECGs available           Cardiac Imaging    TRANSTHORACIC ECHO (TTE) COMPLETE 11/13/2024  Interpretation Summary    Aortic Valve: Valve not well seen but likely trileaflet. No regurgitation. Mild to moderate stenosis. Peak velocity = 2.17 m/s. Mean gradient = 11.00 mmHg. Calculated area by cont eq = 1.50 cm2. Calculated dimensionless index = 0.48.    Mitral Valve: Grossly normal leaflet structure. Normal leaflet motion. Mild mitral annular calcification. Trace regurgitation. No stenosis.    Left Ventricle: Normal ventricle size. Normal wall thickness. Preserved systolic function. Estimated EF 60-65%. Normal diastolic filling pattern for age.    Right Ventricle: Normal ventricle size. Normal systolic function.    Left Atrium: Normal sized atrium.    Right  Atrium: Normal sized atrium.    Tricuspid Valve: Normal structure. No regurgitation. No significant stenosis.    Pulmonic Valve: Normal structure. No regurgitation. No stenosis.    Aorta: Aortic root normal. Sinuses of Valsalva normal-sized. Ascending aorta normal-sized. Aortic arch normal-sized. Descending aorta normal-sized.    Pericardium: Normal structure. No evidence of pericardial effusion.    IVC/SVC: Inferior vena cava not assessed.    CT Heart Coronary Artery Calcium Score 11/26/2024  CORONARY CALCIUM SCORE:  Coronary Calcium Composite Agatston Score: 1573  Left Main: 0  LAD: 580  LCX: 899  RCA: 94     ADDITIONAL FINDINGS:  Platelike subsegmental atelectasis in the right upper lobe lingula and left  lower lobe.  Aortic atherosclerosis with ascending ectasia to 3.9 cm.     --  IMPRESSION:  Total coronary calcium score is 1573. Total calcium volume is 1278.     For a 69 years old Male, this corresponds to between the 90th and 95th  percentile, that is, 90% of patients this age have a LOWER score.     Extensive atherosclerotic plaque. Risk of coronary artery disease: High  likelihood of at least one significant coronary narrowing.  Reference Database: LOZADA 2006.     Noncardiac portions of the examination as described.    Brief Cardiac History:   CAD, on 11/26/2024 calcium score 1573  Family history of early coronary artery disease    Other:   Intolerant to statins and chronic muscle weakness and myalgias      Labs:   Lab Results   Component Value Date     07/25/2024    K 4.7 07/25/2024    BUN 22 07/25/2024    CREATININE 0.96 07/25/2024    EGFR 85 07/25/2024    WBC 6.3 07/25/2024    HGB 15.5 07/25/2024     07/25/2024    AST 20 07/25/2024    ALT 19 07/25/2024    GLUCOSE 90 07/25/2024    HGBA1C 5.7 (H) 07/25/2024    TSH 3.750 07/25/2024       Lipid Profile:   Lab Results   Component Value Date    CHOL 226 (H) 07/25/2024     Lab Results   Component Value Date    TRIG 266 (H) 07/25/2024     Lab Results    Component Value Date    HDL 42 07/25/2024     Lab Results   Component Value Date    LDLCALC 136 (H) 07/25/2024         Assessment/Plan     Coronary artery disease involving native coronary artery of native heart without angina pectoris  On 11/26/2024 his calcium score was 1573.  I discussed this finding with patient.  He reports a strong family history of early coronary artery disease in both parents.  He reports not being able to be active due to muscle pains and weakness.  I discussed with patient the concern of obstructive coronary artery disease and decided with him to do a nuclear stress test to rule out silent ischemia.  We discussed and he understands the need for cardiac catheterization and possible revascularization if suggestion of obstructive coronary artery disease.  He knows to let me know if he develops any cardiac symptoms.  I had an extensive discussion with patient about the process of atherosclerosis and ways of preventing cardiovascular events.  We discussed about lifestyle changes, including regular exercise and a cardiac diet, we discussed about cardiac risk factors and risk factor modification, including family history of early coronary artery disease, high blood pressure, high cholesterol, inactivity, obesity, smoking, sleep apnea, inflammatory conditions, alcohol abuse; and we discussed about medications.  I discussed with him about obstructive sleep apnea, he will let me know if she wants to have a sleep study.  He is intolerant to statins, we will start a PCSK9 inhibitor to decrease risk of cardiovascular events.  He takes  3 of 81 mg aspirin a day, I discussed with him my concern for the risk of bleeding and he agrees to decrease to 2 tablets a day.  I advised him to avoid NSAIDs.    Mixed hyperlipidemia  On 7/25/2024 his LDL was 126.  He has documented intolerance to statins, he started more than to statins over time and experiencing frequent myalgia that improved with discontinuation  of the statin.  He even followed with a rheumatologist.  Now he continues to have significant muscle weakness, the risk of myalgia with statins would likely be high and he declined trying another statin.  With his advanced coronary artery disease, recommend starting a PCSK9 Hipputope to decrease the risk of cardiovascular events.  I discussed with patient and he agrees.  Will recheck a lipid profile.  Check an LP(a) and we discussed the meaning of it.  He has siblings, but he reports he does not have children.  A pamphlet from the National Lipid Association with lifestyle tips to decrease the LDL cholesterol was given to patient.    Nonrheumatic aortic valve stenosis  Echocardiogram with reported mild to moderate aortic stenosis.  Appears clinically compensated.  Check an LP(a).    Primary hypertension  Blood pressure is uncontrolled.  I discussed with patient the importance of controlling the blood pressure to decrease the risk of cardiovascular events.  I advised him to take his blood pressure device with him during next office visit to make sure it is accurate.  I advised him to follow low-salt diet and avoid NSAIDs.  He is reluctant to start any medication at this time for blood pressure.  We discussed about the guideline recommended goal systolic blood pressure less than 120 mmHg and diastolic blood pressure less than 80 mmHg. We discussed about the proper way of checking the blood pressure and a video from the American Heart Association was shown to patient.  Patient will monitor the blood pressure and let me know in 1 week.  During an office visit from 3/5/2025 reported blood pressure was 121/76.    Thoracic aortic ectasia (CMS/HCC)  CT chest from 11/26/2024 reports ascending aortic ectasia of 3.9 cm, I discussed this finding with patient.  Will continue efforts to treat hypertension.  He will let me know if he was to have a sleep study to rule out obstructive sleep apnea.        Orders Placed This Encounter    Procedures    Comprehensive metabolic panel     Standing Status:   Future     Number of Occurrences:   1     Expiration Date:   3/27/2026     Release to patient:   Immediate     Release to patient:   Immediate [1]    Lipid panel     Standing Status:   Future     Number of Occurrences:   1     Expiration Date:   3/27/2026     Release to patient:   Immediate     Release to patient:   Immediate [1]    Magnesium     Standing Status:   Future     Number of Occurrences:   1     Expiration Date:   3/27/2026     Release to patient:   Immediate     Release to patient:   Immediate [1]    Lipoprotein (a)     Standing Status:   Future     Number of Occurrences:   1     Expiration Date:   3/27/2026     Release to patient:   Immediate     Release to patient:   Immediate [1]    [facility] MUSE ECG 12 lead (clinic performed)     Scheduling Instructions:      PLEASE USE THIS ORDER FOR ECG'S PERFORMED IN PHYSICIAN OFFICES     Release to patient:   Immediate [1]      I attest that this visit supports the complexity inherent to evaluation and management associated with medical care services to serve as the continuing focal point for all needed health care services and/or medical care services that are part of ongoing care related to this patient's a single, serious, or complex condition.    Return in about 3 months (around 6/27/2025). Per discussion with patient.     This note was completed in part utilizing a voice recognition software. Grammatical errors, random word insertion, spelling mistakes, and incomplete sentences may be an occasional consequence of the system secondary to software limitations, ambient noise and hardware issues.   Please read the chart carefully and recognize, using context, where substitutions have occurred. If you have any questions or concerns about the context, text or information contained within the body of this dictation, please contact myself, the provider, for further clarification.     Kale LUTZ  Colby, am scribing for, and in the presence of, Josue Eduardo MD.     I, Josue Eduardo MD, personally performed the services described in this documentation as scribed by Kale Bowman in my presence, and it is both accurate and complete.     Josue Eduardo MD  3/27/2025

## 2025-03-27 ENCOUNTER — OFFICE VISIT (OUTPATIENT)
Dept: CARDIOLOGY | Facility: CLINIC | Age: 70
End: 2025-03-27
Payer: MEDICARE

## 2025-03-27 ENCOUNTER — TELEPHONE (OUTPATIENT)
Dept: CARDIOLOGY | Facility: CLINIC | Age: 70
End: 2025-03-27

## 2025-03-27 VITALS
DIASTOLIC BLOOD PRESSURE: 85 MMHG | HEART RATE: 100 BPM | WEIGHT: 228 LBS | BODY MASS INDEX: 32.64 KG/M2 | SYSTOLIC BLOOD PRESSURE: 144 MMHG | OXYGEN SATURATION: 95 % | HEIGHT: 70 IN

## 2025-03-27 DIAGNOSIS — R93.1 ELEVATED CORONARY ARTERY CALCIUM SCORE: Primary | ICD-10-CM

## 2025-03-27 DIAGNOSIS — I35.0 NONRHEUMATIC AORTIC VALVE STENOSIS: ICD-10-CM

## 2025-03-27 DIAGNOSIS — R01.1 MURMUR: ICD-10-CM

## 2025-03-27 DIAGNOSIS — E78.2 MIXED HYPERLIPIDEMIA: ICD-10-CM

## 2025-03-27 DIAGNOSIS — Z76.89 ESTABLISHING CARE WITH NEW DOCTOR, ENCOUNTER FOR: ICD-10-CM

## 2025-03-27 DIAGNOSIS — I10 PRIMARY HYPERTENSION: ICD-10-CM

## 2025-03-27 DIAGNOSIS — I77.810 THORACIC AORTIC ECTASIA (CMS/HCC): ICD-10-CM

## 2025-03-27 DIAGNOSIS — I25.10 CORONARY ARTERY DISEASE INVOLVING NATIVE CORONARY ARTERY OF NATIVE HEART WITHOUT ANGINA PECTORIS: ICD-10-CM

## 2025-03-27 LAB
ATRIAL RATE: 100
P AXIS: 66
PR INTERVAL: 196
QRS DURATION: 96
QT INTERVAL: 336
QTC CALCULATION(BAZETT): 433
R AXIS: 45
T WAVE AXIS: 42
VENTRICULAR RATE: 100

## 2025-03-27 PROCEDURE — G8753 SYS BP > OR = 140: HCPCS | Performed by: INTERNAL MEDICINE

## 2025-03-27 PROCEDURE — 93000 ELECTROCARDIOGRAM COMPLETE: CPT | Performed by: INTERNAL MEDICINE

## 2025-03-27 PROCEDURE — G8754 DIAS BP LESS 90: HCPCS | Performed by: INTERNAL MEDICINE

## 2025-03-27 PROCEDURE — 99204 OFFICE O/P NEW MOD 45 MIN: CPT | Performed by: INTERNAL MEDICINE

## 2025-03-27 PROCEDURE — G2211 COMPLEX E/M VISIT ADD ON: HCPCS | Performed by: INTERNAL MEDICINE

## 2025-03-27 RX ORDER — EVOLOCUMAB 140 MG/ML
140 INJECTION, SOLUTION SUBCUTANEOUS
Qty: 6 ML | Refills: 3 | Status: SHIPPED | OUTPATIENT
Start: 2025-03-27 | End: 2025-03-31

## 2025-03-27 NOTE — LETTER
March 27, 2025     Keshawn Douglas DO  [address] [facility]  [address]  [facility] [address]    Patient: Tomas Taylor  YOB: 1955  Date of Visit: 3/27/2025      Dear Dr. Douglas:    Thank you for referring Tomas Taylor to me for evaluation. Below are my notes for this consultation.    If you have questions, please do not hesitate to call me. I look forward to following your patient along with you.         Sincerely,        Josue Eduardo MD        CC: No Recipients    Josue Eduardo MD  3/27/2025  4:49 PM  Sign when Signing Visit       Cardiology  Office Consult Note         Reason for visit:   Chief Complaint   Patient presents with    Establish Care       HPI    Tomas Taylor is a 69 y.o. male who presents to the office for cardiovascular evaluation.    He was referred by his PCP, Dr. Douglas for elevated CACS.     Echocardiogram 11/13/24    CT Heart Coronary Calcium Score 11/26/24   CACS 1573 (, , RCA 94)    AHA Life Essential 8s   Diet: A pamphlet from the National Lipid Association with lifestyle tips to decrease the LDL cholesterol was given to patient.      Activity: He is not currently exercising    Sleep: Discussed with patient about obstructive sleep apnea, he will let me know if he wants to have a sleep study  Weight: Stable  Cholesterol: 7/25/24 FLP: , , HDL 42,   Diabetes: 7/25/24 Hgb A1c: 5.7   Blood pressure: BP elevated in the office. He has an arm BP cuff at home but does not monitor his BP regularly.  We discussed about the guideline recommended goal systolic blood pressure less than 120 mmHg and diastolic blood pressure less than 80 mmHg. We discussed about the proper way of checking the blood pressure and a video from the American Heart Association was shown to patient.  Patient will monitor the blood pressure and let me know.  Smoking: Never smoked     Atherosclerosis counseling:   I had an extensive discussion with patient about the  process of atherosclerosis and ways of preventing cardiovascular events. We discussed about lifestyle changes, including regular exercise and a cardiac diet, we discussed about cardiac risk factors and risk factor modification, including family history of early coronary artery disease, high blood pressure, high cholesterol, inactivity, obesity, smoking, sleep apnea, inflammatory conditions, alcohol abuse; and we discussed about medications.      Anticoagulation:   Patient takes 3 x 81 mg aspirin daily.  I discussed with him my concern for risk of bleeding, he agrees to decrease to 2 x 81 mg aspirin daily.  I advised him to avoid NSAIDs    Past Medical History:   Diagnosis Date    Lipid disorder        Past Surgical History   Procedure Laterality Date    Abdominal surgery  12/2023    Back surgery  05/19/1999    Tonsillectomy      at age 4        Social History     Tobacco Use   Smoking Status Never   Smokeless Tobacco Never     Social History     Tobacco Use    Smoking status: Never    Smokeless tobacco: Never   Vaping Use    Vaping status: Never Used   Substance Use Topics    Alcohol use: Yes     Comment: rarely    Drug use: Never       Family History   Problem Relation Name Age of Onset    Heart attack Biological Mother  60    Heart attack Biological Father          x2 , age 50 and 72    Heart attack Father's Brother         Allergies:  Simvastatin and Penicillins    Current Outpatient Medications   Medication Sig Dispense Refill    allopurinoL (ZYLOPRIM) 300 mg tablet Take 300 mg by mouth daily. (Patient taking differently: Take 300 mg by mouth as needed.)      evolocumab (REPATHA SURECLICK) 140 mg/mL pen Inject 1 mL (140 mg total) under the skin every 14 (fourteen) days. 6 mL 3    finasteride (PROPECIA) 1 mg tablet Take 1 mg by mouth daily.      MITIGARE 0.6 mg capsule Take 0.6 mg by mouth daily. (Patient taking differently: Take 0.6 mg by mouth as needed.)      mv-min/folic/K1/lycopen/lutein  (MEN 50 PLUS MULTIVITAMIN ORAL) Take 1 tablet by mouth once daily.      pantoprazole (PROTONIX) 40 mg EC tablet Take 40 mg by mouth daily. (Patient taking differently: Take 40 mg by mouth as needed.)      testosterone (ANDROGEL) 1 % (50 mg/5 gram) gel in packet APPLY 1 PACKET EVERY DAY BY TRANSDERMAL ROUTE (Patient taking differently: Place 50 mg on the skin daily.)      vibegron (GEMTESA) 75 mg tablet Gemtesa 75 mg tablet   Take 1 tablet every day by oral route.       No current facility-administered medications for this visit.       Medication changes:       ROS He comes in for an initial evaluation. He denies any chest pain, palpitations, shortness of breath, LE edema, dizziness, or lightheadedness.     Objective    Vitals:    03/27/25 1404   BP: (!) 144/85   Pulse: 100   SpO2: 95%       Wt Readings from Last 1 Encounters:   03/27/25 103 kg (228 lb)       Body surface area is 2.26 meters squared.    Body mass index is 32.71 kg/m².    Physical Exam  Constitutional:       Appearance: Normal appearance.   Neck:      Vascular: No carotid bruit or JVD.   Cardiovascular:      Rate and Rhythm: Normal rate and regular rhythm. Extrasystoles are present.     Pulses: Normal pulses.      Heart sounds: Murmur (2/6 ANGELICA) heard.   Pulmonary:      Effort: Pulmonary effort is normal. No respiratory distress.      Breath sounds: Normal breath sounds.   Musculoskeletal:      Right lower leg: No edema.      Left lower leg: No edema.   Skin:     General: Skin is warm and dry.   Neurological:      General: No focal deficit present.      Mental Status: He is alert and oriented to person, place, and time.           ECG   Sinus rhythm with occasional Premature ventricular complexes  Nonspecific ST and T wave abnormality  Abnormal ECG  No previous ECGs available           Cardiac Imaging    TRANSTHORACIC ECHO (TTE) COMPLETE 11/13/2024  Interpretation Summary    Aortic Valve: Valve not well seen but likely trileaflet. No regurgitation.  Mild to moderate stenosis. Peak velocity = 2.17 m/s. Mean gradient = 11.00 mmHg. Calculated area by cont eq = 1.50 cm2. Calculated dimensionless index = 0.48.    Mitral Valve: Grossly normal leaflet structure. Normal leaflet motion. Mild mitral annular calcification. Trace regurgitation. No stenosis.    Left Ventricle: Normal ventricle size. Normal wall thickness. Preserved systolic function. Estimated EF 60-65%. Normal diastolic filling pattern for age.    Right Ventricle: Normal ventricle size. Normal systolic function.    Left Atrium: Normal sized atrium.    Right Atrium: Normal sized atrium.    Tricuspid Valve: Normal structure. No regurgitation. No significant stenosis.    Pulmonic Valve: Normal structure. No regurgitation. No stenosis.    Aorta: Aortic root normal. Sinuses of Valsalva normal-sized. Ascending aorta normal-sized. Aortic arch normal-sized. Descending aorta normal-sized.    Pericardium: Normal structure. No evidence of pericardial effusion.    IVC/SVC: Inferior vena cava not assessed.    CT Heart Coronary Artery Calcium Score 11/26/2024  CORONARY CALCIUM SCORE:  Coronary Calcium Composite Agatston Score: 1573  Left Main: 0  LAD: 580  LCX: 899  RCA: 94     ADDITIONAL FINDINGS:  Platelike subsegmental atelectasis in the right upper lobe lingula and left  lower lobe.  Aortic atherosclerosis with ascending ectasia to 3.9 cm.     --  IMPRESSION:  Total coronary calcium score is 1573. Total calcium volume is 1278.     For a 69 years old Male, this corresponds to between the 90th and 95th  percentile, that is, 90% of patients this age have a LOWER score.     Extensive atherosclerotic plaque. Risk of coronary artery disease: High  likelihood of at least one significant coronary narrowing.  Reference Database: LOZADA 2006.     Noncardiac portions of the examination as described.    Brief Cardiac History:   CAD, on 11/26/2024 calcium score 1573  Family history of early coronary artery  disease    Other:   Intolerant to statins and chronic muscle weakness and myalgias      Labs:   Lab Results   Component Value Date     07/25/2024    K 4.7 07/25/2024    BUN 22 07/25/2024    CREATININE 0.96 07/25/2024    EGFR 85 07/25/2024    WBC 6.3 07/25/2024    HGB 15.5 07/25/2024     07/25/2024    AST 20 07/25/2024    ALT 19 07/25/2024    GLUCOSE 90 07/25/2024    HGBA1C 5.7 (H) 07/25/2024    TSH 3.750 07/25/2024       Lipid Profile:   Lab Results   Component Value Date    CHOL 226 (H) 07/25/2024     Lab Results   Component Value Date    TRIG 266 (H) 07/25/2024     Lab Results   Component Value Date    HDL 42 07/25/2024     Lab Results   Component Value Date    LDLCALC 136 (H) 07/25/2024         Assessment/Plan    Coronary artery disease involving native coronary artery of native heart without angina pectoris  On 11/26/2024 his calcium score was 1573.  I discussed this finding with patient.  He reports a strong family history of early coronary artery disease in both parents.  He reports not being able to be active due to muscle pains and weakness.  I discussed with patient the concern of obstructive coronary artery disease and decided with him to do a nuclear stress test to rule out silent ischemia.  We discussed and he understands the need for cardiac catheterization and possible revascularization if suggestion of obstructive coronary artery disease.  He knows to let me know if he develops any cardiac symptoms.  I had an extensive discussion with patient about the process of atherosclerosis and ways of preventing cardiovascular events.  We discussed about lifestyle changes, including regular exercise and a cardiac diet, we discussed about cardiac risk factors and risk factor modification, including family history of early coronary artery disease, high blood pressure, high cholesterol, inactivity, obesity, smoking, sleep apnea, inflammatory conditions, alcohol abuse; and we discussed about  medications.  I discussed with him about obstructive sleep apnea, he will let me know if she wants to have a sleep study.  He is intolerant to statins, we will start a PCSK9 inhibitor to decrease risk of cardiovascular events.  He takes  3 of 81 mg aspirin a day, I discussed with him my concern for the risk of bleeding and he agrees to decrease to 2 tablets a day.  I advised him to avoid NSAIDs.    Mixed hyperlipidemia  On 7/25/2024 his LDL was 126.  He has documented intolerance to statins, he started more than to statins over time and experiencing frequent myalgia that improved with discontinuation of the statin.  He even followed with a rheumatologist.  Now he continues to have significant muscle weakness, the risk of myalgia with statins would likely be high and he declined trying another statin.  With his advanced coronary artery disease, recommend starting a PCSK9 Hipputope to decrease the risk of cardiovascular events.  I discussed with patient and he agrees.  Will recheck a lipid profile.  Check an LP(a) and we discussed the meaning of it.  He has siblings, but he reports he does not have children.  A pamphlet from the National Lipid Association with lifestyle tips to decrease the LDL cholesterol was given to patient.    Nonrheumatic aortic valve stenosis  Echocardiogram with reported mild to moderate aortic stenosis.  Appears clinically compensated.  Check an LP(a).    Primary hypertension  Blood pressure is uncontrolled.  I discussed with patient the importance of controlling the blood pressure to decrease the risk of cardiovascular events.  I advised him to take his blood pressure device with him during next office visit to make sure it is accurate.  I advised him to follow low-salt diet and avoid NSAIDs.  He is reluctant to start any medication at this time for blood pressure.  We discussed about the guideline recommended goal systolic blood pressure less than 120 mmHg and diastolic blood pressure less  than 80 mmHg. We discussed about the proper way of checking the blood pressure and a video from the American Heart Association was shown to patient.  Patient will monitor the blood pressure and let me know in 1 week.  During an office visit from 3/5/2025 reported blood pressure was 121/76.    Thoracic aortic ectasia (CMS/HCC)  CT chest from 11/26/2024 reports ascending aortic ectasia of 3.9 cm, I discussed this finding with patient.  Will continue efforts to treat hypertension.  He will let me know if he was to have a sleep study to rule out obstructive sleep apnea.        Orders Placed This Encounter   Procedures    Comprehensive metabolic panel     Standing Status:   Future     Number of Occurrences:   1     Expiration Date:   3/27/2026     Release to patient:   Immediate     Release to patient:   Immediate [1]    Lipid panel     Standing Status:   Future     Number of Occurrences:   1     Expiration Date:   3/27/2026     Release to patient:   Immediate     Release to patient:   Immediate [1]    Magnesium     Standing Status:   Future     Number of Occurrences:   1     Expiration Date:   3/27/2026     Release to patient:   Immediate     Release to patient:   Immediate [1]    Lipoprotein (a)     Standing Status:   Future     Number of Occurrences:   1     Expiration Date:   3/27/2026     Release to patient:   Immediate     Release to patient:   Immediate [1]    [facility] MUSE ECG 12 lead (clinic performed)     Scheduling Instructions:      PLEASE USE THIS ORDER FOR ECG'S PERFORMED IN PHYSICIAN OFFICES     Release to patient:   Immediate [1]      I attest that this visit supports the complexity inherent to evaluation and management associated with medical care services to serve as the continuing focal point for all needed health care services and/or medical care services that are part of ongoing care related to this patient's a single, serious, or complex condition.    Return in about 3 months (around  6/27/2025). Per discussion with patient.     This note was completed in part utilizing a voice recognition software. Grammatical errors, random word insertion, spelling mistakes, and incomplete sentences may be an occasional consequence of the system secondary to software limitations, ambient noise and hardware issues.   Please read the chart carefully and recognize, using context, where substitutions have occurred. If you have any questions or concerns about the context, text or information contained within the body of this dictation, please contact myself, the provider, for further clarification.     I, Kale Bowman, am scribing for, and in the presence of, Josue Eduardo MD.     IJosue MD, personally performed the services described in this documentation as scribed by Kale Bowman in my presence, and it is both accurate and complete.     Josue Eduardo MD  3/27/2025

## 2025-03-27 NOTE — ASSESSMENT & PLAN NOTE
Echocardiogram with reported mild to moderate aortic stenosis.  Appears clinically compensated.  Check an LP(a).

## 2025-03-27 NOTE — ASSESSMENT & PLAN NOTE
On 11/26/2024 his calcium score was 1573.  I discussed this finding with patient.  He reports a strong family history of early coronary artery disease in both parents.  He reports not being able to be active due to muscle pains and weakness.  I discussed with patient the concern of obstructive coronary artery disease and decided with him to do a nuclear stress test to rule out silent ischemia.  We discussed and he understands the need for cardiac catheterization and possible revascularization if suggestion of obstructive coronary artery disease.  He knows to let me know if he develops any cardiac symptoms.  I had an extensive discussion with patient about the process of atherosclerosis and ways of preventing cardiovascular events.  We discussed about lifestyle changes, including regular exercise and a cardiac diet, we discussed about cardiac risk factors and risk factor modification, including family history of early coronary artery disease, high blood pressure, high cholesterol, inactivity, obesity, smoking, sleep apnea, inflammatory conditions, alcohol abuse; and we discussed about medications.  I discussed with him about obstructive sleep apnea, he will let me know if she wants to have a sleep study.  He is intolerant to statins, we will start a PCSK9 inhibitor to decrease risk of cardiovascular events.  He takes  3 of 81 mg aspirin a day, I discussed with him my concern for the risk of bleeding and he agrees to decrease to 2 tablets a day.  I advised him to avoid NSAIDs.

## 2025-03-27 NOTE — ASSESSMENT & PLAN NOTE
Blood pressure is uncontrolled.  I discussed with patient the importance of controlling the blood pressure to decrease the risk of cardiovascular events.  I advised him to take his blood pressure device with him during next office visit to make sure it is accurate.  I advised him to follow low-salt diet and avoid NSAIDs.  He is reluctant to start any medication at this time for blood pressure.  We discussed about the guideline recommended goal systolic blood pressure less than 120 mmHg and diastolic blood pressure less than 80 mmHg. We discussed about the proper way of checking the blood pressure and a video from the American Heart Association was shown to patient.  Patient will monitor the blood pressure and let me know in 1 week.  During an office visit from 3/5/2025 reported blood pressure was 121/76.

## 2025-03-27 NOTE — TELEPHONE ENCOUNTER
Please contact patient to schedule Nuclear Stress Test.      [phone] best number to reach patient.     Thank you

## 2025-03-27 NOTE — ASSESSMENT & PLAN NOTE
On 7/25/2024 his LDL was 126.  He has documented intolerance to statins, he started more than to statins over time and experiencing frequent myalgia that improved with discontinuation of the statin.  He even followed with a rheumatologist.  Now he continues to have significant muscle weakness, the risk of myalgia with statins would likely be high and he declined trying another statin.  With his advanced coronary artery disease, recommend starting a PCSK9 Hipputope to decrease the risk of cardiovascular events.  I discussed with patient and he agrees.  Will recheck a lipid profile.  Check an LP(a) and we discussed the meaning of it.  He has siblings, but he reports he does not have children.  A pamphlet from the National Lipid Association with lifestyle tips to decrease the LDL cholesterol was given to patient.

## 2025-03-27 NOTE — ASSESSMENT & PLAN NOTE
CT chest from 11/26/2024 reports ascending aortic ectasia of 3.9 cm, I discussed this finding with patient.  Will continue efforts to treat hypertension.  He will let me know if he was to have a sleep study to rule out obstructive sleep apnea.

## 2025-03-31 ENCOUNTER — TELEPHONE (OUTPATIENT)
Dept: CARDIOLOGY | Facility: CLINIC | Age: 70
End: 2025-03-31
Payer: MEDICARE

## 2025-03-31 RX ORDER — ALIROCUMAB 150 MG/ML
150 INJECTION, SOLUTION SUBCUTANEOUS
Qty: 6 ML | Refills: 3 | Status: SHIPPED | OUTPATIENT
Start: 2025-03-31

## 2025-03-31 NOTE — TELEPHONE ENCOUNTER
Alesha Carmen, I received a fax from [employer] reg Mr. Taylor's Repatha.  Apparently the Repatha is NOT on formulary, and the alternative is Praluent. Do you want to switch to Praluent?Please respond, BB

## 2025-03-31 NOTE — TELEPHONE ENCOUNTER
GARTH Henderson, I spoke with Bekah (pharmacists) @ [facility]. She stated Mr. Taylor has [employer] as his prescription Plan and he also has [facility]. Apparently [employer] will pay for the Praleunt . So Bekah is going thru the [employer] plan, BB

## 2025-04-02 ENCOUNTER — OFFICE VISIT (OUTPATIENT)
Dept: PHYSICAL MEDICINE AND REHAB | Facility: HOSPITAL | Age: 70
End: 2025-04-02
Attending: PHYSICAL MEDICINE & REHABILITATION
Payer: MEDICARE

## 2025-04-02 DIAGNOSIS — M54.16 LUMBAR RADICULOPATHY: Primary | ICD-10-CM

## 2025-04-02 PROCEDURE — 95886 MUSC TEST DONE W/N TEST COMP: CPT

## 2025-04-02 PROCEDURE — 95910 NRV CNDJ TEST 7-8 STUDIES: CPT

## 2025-04-02 NOTE — PROGRESS NOTES
Tomas Taylor is a 69 y.o.  male  who presents to the EMG lab today for evaluation of lumbar spinal stenosis with paresthesias in bilateral anterior thighs    EMG/NCS  Nerve conduction study:  Bilateral tibial motor NCS is notable for reduced amplitude with intact latency and conduction velocity.  Bilateral peroneal motor NCS is unremarkable.  Bilateral peroneal sensory NCS was unremarkable.  Right sural sensory NCS was unremarkable    Needle Examination:  Needle EMG to bilateral adductor longus, vastus medialis, tibialis anterior, peroneal longus, semimembranosus, medial head of the gastroc was notable for increased motor unit action potentials with   Mostly moderately reduced recruitment     Impression:  1.  There is electrodiagnostic evidence of chronic L2-S1 lumbar radiculopathy without evidence of acute denervation.  This is consistent with patient's previous EMG study    Assessment & Plan:  Tomas Taylor was informed of the results of his study.  He will follow back up with Dr. Wang      Please contact me with any questions,   Mary Anne Abel, DO

## 2025-04-03 ENCOUNTER — RESULTS FOLLOW-UP (OUTPATIENT)
Dept: CARDIOLOGY | Facility: CLINIC | Age: 70
End: 2025-04-03
Payer: MEDICARE

## 2025-04-03 ENCOUNTER — HOSPITAL ENCOUNTER (OUTPATIENT)
Dept: CARDIOLOGY | Facility: CLINIC | Age: 70
Discharge: HOME | End: 2025-04-03
Attending: INTERNAL MEDICINE
Payer: MEDICARE

## 2025-04-03 VITALS — BODY MASS INDEX: 32.64 KG/M2 | HEIGHT: 70 IN | WEIGHT: 228 LBS

## 2025-04-03 DIAGNOSIS — I25.10 CORONARY ARTERY DISEASE INVOLVING NATIVE CORONARY ARTERY OF NATIVE HEART WITHOUT ANGINA PECTORIS: ICD-10-CM

## 2025-04-03 LAB
CV NM TETROFOSMIN REST DOSE: 10.3 MCI
CV NM TETROFOSMIN STRESS DOSE: 30.8 MCI
MLH CV NM REST ADMIN DATE: NORMAL MM/DD/YYYY
MLH CV NM REST ADMIN TIME: NORMAL HR:MIN
MLH CV NM STRESS ADMIN DATE: NORMAL MM/DD/YYYY
MLH CV NM STRESS ADMIN TIME: NORMAL HR:MIN
NUC STRESS EJECTION FRACTION: 60 %
STRESS BASELINE BP: NORMAL MMHG
STRESS BASELINE HR: 91 BPM
STRESS PERCENT HR: 70 %
STRESS POST PEAK BP: NORMAL MMHG
STRESS POST PEAK HR: 105 BPM
STRESS TARGET HR: 128 BPM

## 2025-04-03 PROCEDURE — 93015 CV STRESS TEST SUPVJ I&R: CPT | Performed by: INTERNAL MEDICINE

## 2025-04-03 PROCEDURE — 78452 HT MUSCLE IMAGE SPECT MULT: CPT | Performed by: INTERNAL MEDICINE

## 2025-04-03 PROCEDURE — A9502 TC99M TETROFOSMIN: HCPCS | Performed by: INTERNAL MEDICINE

## 2025-04-03 PROCEDURE — 200200 PR NO CHARGE: Performed by: INTERNAL MEDICINE

## 2025-04-03 RX ORDER — REGADENOSON 0.08 MG/ML
0.4 INJECTION, SOLUTION INTRAVENOUS ONCE
Status: COMPLETED | OUTPATIENT
Start: 2025-04-03 | End: 2025-04-03

## 2025-04-03 RX ADMIN — REGADENOSON 0.4 MG: 0.08 INJECTION, SOLUTION INTRAVENOUS at 13:24

## 2025-04-03 NOTE — TELEPHONE ENCOUNTER
----- Message from Josue Eduardo sent at 4/3/2025  4:12 PM EDT -----  Can you please let the patient know that the stress test did not show any findings to suggest major blockages in the arteries of the heart that need to be opened.  We will discuss in more detail   during our next office visit.  ----- Message -----  From: Angelo Merritt MD  Sent: 4/3/2025   3:28 PM EDT  To: Josue Eduardo MD

## 2025-05-06 ENCOUNTER — RX ONLY (RX ONLY)
Age: 70
End: 2025-05-06

## 2025-05-06 ENCOUNTER — APPOINTMENT (OUTPATIENT)
Dept: URBAN - METROPOLITAN AREA CLINIC 374 | Facility: CLINIC | Age: 70
Setting detail: DERMATOLOGY
End: 2025-05-06

## 2025-05-06 DIAGNOSIS — L21.8 OTHER SEBORRHEIC DERMATITIS: ICD-10-CM

## 2025-05-06 DIAGNOSIS — L57.0 ACTINIC KERATOSIS: ICD-10-CM | Status: INADEQUATELY CONTROLLED

## 2025-05-06 DIAGNOSIS — L81.4 OTHER MELANIN HYPERPIGMENTATION: ICD-10-CM | Status: UNCHANGED

## 2025-05-06 DIAGNOSIS — D22 MELANOCYTIC NEVI: ICD-10-CM | Status: UNCHANGED

## 2025-05-06 DIAGNOSIS — L64.8 OTHER ANDROGENIC ALOPECIA: ICD-10-CM | Status: IMPROVED

## 2025-05-06 DIAGNOSIS — D18.0 HEMANGIOMA: ICD-10-CM | Status: UNCHANGED

## 2025-05-06 DIAGNOSIS — Z71.89 OTHER SPECIFIED COUNSELING: ICD-10-CM

## 2025-05-06 DIAGNOSIS — Z85.828 PERSONAL HISTORY OF OTHER MALIGNANT NEOPLASM OF SKIN: ICD-10-CM

## 2025-05-06 DIAGNOSIS — L82.1 OTHER SEBORRHEIC KERATOSIS: ICD-10-CM | Status: UNCHANGED

## 2025-05-06 DIAGNOSIS — L81.5 LEUKODERMA, NOT ELSEWHERE CLASSIFIED: ICD-10-CM | Status: UNCHANGED

## 2025-05-06 DIAGNOSIS — L91.8 OTHER HYPERTROPHIC DISORDERS OF THE SKIN: ICD-10-CM | Status: UNCHANGED

## 2025-05-06 PROBLEM — D22.5 MELANOCYTIC NEVI OF TRUNK: Status: ACTIVE | Noted: 2025-05-06

## 2025-05-06 PROBLEM — D18.01 HEMANGIOMA OF SKIN AND SUBCUTANEOUS TISSUE: Status: ACTIVE | Noted: 2025-05-06

## 2025-05-06 PROCEDURE — 17000 DESTRUCT PREMALG LESION: CPT

## 2025-05-06 PROCEDURE — ? COUNSELING

## 2025-05-06 PROCEDURE — 99214 OFFICE O/P EST MOD 30 MIN: CPT | Mod: 25

## 2025-05-06 PROCEDURE — ? TREATMENT REGIMEN

## 2025-05-06 PROCEDURE — ? TREATMENT GOALS

## 2025-05-06 PROCEDURE — ? PRESCRIPTION

## 2025-05-06 PROCEDURE — 17003 DESTRUCT PREMALG LES 2-14: CPT

## 2025-05-06 PROCEDURE — ? PRESCRIPTION MEDICATION MANAGEMENT

## 2025-05-06 PROCEDURE — ? LIQUID NITROGEN

## 2025-05-06 PROCEDURE — ? SUNSCREEN RECOMMENDATIONS

## 2025-05-06 PROCEDURE — ? FULL BODY SKIN EXAM

## 2025-05-06 RX ORDER — CLOBETASOL PROPIONATE 0.5 MG/ML
SOLUTION TOPICAL BID
Qty: 50 | Refills: 6 | Status: ERX

## 2025-05-06 RX ORDER — FINASTERIDE 1 MG/1
TABLET, FILM COATED ORAL QDAY
Qty: 360 | Refills: 0 | Status: ERX

## 2025-05-06 RX ORDER — HYDROCORTISONE 25 MG/G
OINTMENT TOPICAL BID
Qty: 28.35 | Refills: 6 | Status: ERX | COMMUNITY
Start: 2025-05-06

## 2025-05-06 RX ORDER — KETOCONAZOLE 20 MG/ML
SHAMPOO, SUSPENSION TOPICAL
Qty: 120 | Refills: 6 | Status: ERX

## 2025-05-06 RX ORDER — FLUOCINOLONE ACETONIDE 0.11 MG/ML
OIL TOPICAL
Qty: 118.28 | Refills: 6 | Status: ERX | COMMUNITY
Start: 2025-05-06

## 2025-05-06 RX ORDER — KETOCONAZOLE 20 MG/G
CREAM TOPICAL BID
Qty: 60 | Refills: 6 | Status: ERX | COMMUNITY
Start: 2025-05-06

## 2025-05-06 RX ADMIN — FLUOCINOLONE ACETONIDE: 0.11 OIL TOPICAL at 00:00

## 2025-05-06 RX ADMIN — HYDROCORTISONE: 25 OINTMENT TOPICAL at 00:00

## 2025-05-06 RX ADMIN — KETOCONAZOLE: 20 CREAM TOPICAL at 00:00

## 2025-05-06 ASSESSMENT — LOCATION DETAILED DESCRIPTION DERM
LOCATION DETAILED: LEFT CENTRAL OCCIPITAL SCALP
LOCATION DETAILED: RIGHT INFERIOR OCCIPITAL SCALP
LOCATION DETAILED: INFERIOR THORACIC SPINE
LOCATION DETAILED: RIGHT CENTRAL PARIETAL SCALP
LOCATION DETAILED: RIGHT DISTAL DORSAL FOREARM
LOCATION DETAILED: POSTERIOR MID-PARIETAL SCALP
LOCATION DETAILED: LEFT AXILLARY VAULT
LOCATION DETAILED: RIGHT AXILLARY VAULT
LOCATION DETAILED: LEFT INFERIOR MEDIAL MALAR CHEEK
LOCATION DETAILED: EPIGASTRIC SKIN
LOCATION DETAILED: RIGHT CENTRAL EYEBROW
LOCATION DETAILED: RIGHT MEDIAL UPPER BACK
LOCATION DETAILED: LEFT INFERIOR CRUS OF ANTIHELIX
LOCATION DETAILED: RIGHT PROXIMAL PRETIBIAL REGION
LOCATION DETAILED: LEFT CENTRAL EYEBROW
LOCATION DETAILED: LEFT LATERAL FOREHEAD
LOCATION DETAILED: SUPERIOR THORACIC SPINE
LOCATION DETAILED: RIGHT SUPERIOR PARIETAL SCALP
LOCATION DETAILED: LEFT DISTAL DORSAL FOREARM
LOCATION DETAILED: RIGHT MEDIAL FRONTAL SCALP
LOCATION DETAILED: RIGHT INFERIOR CRUS OF ANTIHELIX
LOCATION DETAILED: LEFT PROXIMAL PRETIBIAL REGION
LOCATION DETAILED: RIGHT INFERIOR LATERAL NECK
LOCATION DETAILED: MEDIAL FRONTAL SCALP
LOCATION DETAILED: RIGHT RADIAL DORSAL HAND
LOCATION DETAILED: LEFT RADIAL DORSAL HAND
LOCATION DETAILED: LEFT INFERIOR LATERAL NECK

## 2025-05-06 ASSESSMENT — LOCATION SIMPLE DESCRIPTION DERM
LOCATION SIMPLE: UPPER BACK
LOCATION SIMPLE: ABDOMEN
LOCATION SIMPLE: RIGHT SCALP
LOCATION SIMPLE: RIGHT ANTERIOR NECK
LOCATION SIMPLE: RIGHT UPPER BACK
LOCATION SIMPLE: LEFT EAR
LOCATION SIMPLE: LEFT ANTERIOR NECK
LOCATION SIMPLE: RIGHT EAR
LOCATION SIMPLE: LEFT PRETIBIAL REGION
LOCATION SIMPLE: LEFT HAND
LOCATION SIMPLE: LEFT AXILLARY VAULT
LOCATION SIMPLE: LEFT FOREHEAD
LOCATION SIMPLE: RIGHT PRETIBIAL REGION
LOCATION SIMPLE: RIGHT EYEBROW
LOCATION SIMPLE: SCALP
LOCATION SIMPLE: LEFT FOREARM
LOCATION SIMPLE: RIGHT FOREARM
LOCATION SIMPLE: RIGHT HAND
LOCATION SIMPLE: POSTERIOR SCALP
LOCATION SIMPLE: FRONTAL SCALP
LOCATION SIMPLE: RIGHT AXILLARY VAULT
LOCATION SIMPLE: LEFT CHEEK
LOCATION SIMPLE: LEFT EYEBROW

## 2025-05-06 ASSESSMENT — LOCATION ZONE DERM
LOCATION ZONE: HAND
LOCATION ZONE: ARM
LOCATION ZONE: LEG
LOCATION ZONE: NECK
LOCATION ZONE: SCALP
LOCATION ZONE: TRUNK
LOCATION ZONE: FACE
LOCATION ZONE: EAR
LOCATION ZONE: AXILLAE

## 2025-05-06 ASSESSMENT — SEVERITY ASSESSMENT: HOW SEVERE IS THIS PATIENT'S CONDITION?: MODERATE

## 2025-05-06 ASSESSMENT — PAIN INTENSITY VAS: HOW INTENSE IS YOUR PAIN 0 BEING NO PAIN, 10 BEING THE MOST SEVERE PAIN POSSIBLE?: NO PAIN

## 2025-05-06 ASSESSMENT — TOTAL NUMBER OF LESIONS: # OF LESIONS?: 5

## 2025-05-06 NOTE — PROCEDURE: PRESCRIPTION MEDICATION MANAGEMENT
Render In Strict Bullet Format?: No
Detail Level: Zone
Continue Regimen: Propecia 1mg - take one tablet po qday
Initiate Treatment: FOR THE SCALP:\\n-Clobetasol 0.05 % scalp solution Scalp Frequency: BID Sig: Apply a few scattered drops the scalp BID x 2 weeks when flaky/flaring, then take 2 weeks off. Do not use more than 2 weeks without taking a 2 week break in between. Avoid getting into eyes\\n\\n\\n-Derma-Smoothe/FS Scalp Oil 0.01 % Scalp Sig: Apply a few drops to the scalp BID x 2 weeks when flaring/flaky, then twice weekly for maintenance. Do not use for more than 2 weeks consistently without taking a break. Avoid getting into eyes.\\n\\n-Ketoconazole 2 % shampoo TP Sig: Lather into scalp at each wash, let sit for 10-15 minutes then rinse out. Use 2-3x/week\\n\\n--->Recommend the following Over-The-Counter treatment(s): Selsun Blue, TGel, TSal or Head & Shoulders shampoo to alternate with ketoconazole 2% shampoo\\n\\nFOR THE FACE: \\n-Hydrocortisone 2.5 % ointment or cream: Apply to affected skin twice a day for no more than 2-3 weeks in a row\\n\\n-Ketoconazole 2 % cream: Apply to affected skin twice a day
Plan: Re-evaluate in 6 months

## 2025-05-06 NOTE — PROCEDURE: LIQUID NITROGEN
Detail Level: Detailed
Post-Care Instructions: I reviewed with the patient in detail post-care instructions, including but not only, to wash daily soap and water and to apply white petrolatum (Vaseline) to affected area. In addition, photoprotection, including consistent use of sunscreen SPF>30/broad spectrum every 2 hours.
Number Of Freeze-Thaw Cycles: 2 freeze-thaw cycles
Duration Of Freeze Thaw-Cycle (Seconds): 15
Render Note In Bullet Format When Appropriate: No
Application Tool (Optional): Liquid Nitrogen Sprayer
Show Aperture Variable?: Yes
Consent: The patient's consent was obtained including but not limited to risks of crusting, scabbing, blistering, scarring, darker or lighter pigmentary change, recurrence, incomplete removal and infection.

## 2025-05-06 NOTE — HPI: HAIR LOSS
How Did The Hair Loss Occur?: gradual in onset
How Severe Is Your Hair Loss?: mild
Additional History: Pt on propecia

## 2025-07-17 ENCOUNTER — HOSPITAL ENCOUNTER (OUTPATIENT)
Dept: RADIOLOGY | Facility: HOSPITAL | Age: 70
Discharge: HOME | End: 2025-07-17
Attending: PHYSICAL MEDICINE & REHABILITATION
Payer: MEDICARE

## 2025-07-17 DIAGNOSIS — M75.121 COMPLETE TEAR OF RIGHT ROTATOR CUFF: ICD-10-CM

## 2025-08-28 LAB
ALBUMIN SERPL-MCNC: 4.3 G/DL (ref 3.8–4.8)
ALP SERPL-CCNC: 87 IU/L (ref 44–121)
ALT SERPL-CCNC: 27 IU/L (ref 0–44)
AST SERPL-CCNC: 23 IU/L (ref 0–40)
BILIRUB SERPL-MCNC: <0.2 MG/DL (ref 0–1.2)
BUN SERPL-MCNC: 19 MG/DL (ref 8–27)
BUN/CREAT SERPL: 20 (ref 10–24)
CALCIUM SERPL-MCNC: 9.4 MG/DL (ref 8.6–10.2)
CHLORIDE SERPL-SCNC: 103 MMOL/L (ref 96–106)
CHOLEST SERPL-MCNC: 143 MG/DL (ref 100–199)
CO2 SERPL-SCNC: 20 MMOL/L (ref 20–29)
CREAT SERPL-MCNC: 0.93 MG/DL (ref 0.76–1.27)
EGFRCR SERPLBLD CKD-EPI 2021: 88 ML/MIN/1.73
GLOBULIN SER CALC-MCNC: 2.4 G/DL (ref 1.5–4.5)
GLUCOSE SERPL-MCNC: 78 MG/DL (ref 70–99)
HDLC SERPL-MCNC: 44 MG/DL
LDLC SERPL CALC-MCNC: 60 MG/DL (ref 0–99)
LPA SERPL-SCNC: 36.2 NMOL/L
MAGNESIUM SERPL-MCNC: 2.1 MG/DL (ref 1.6–2.3)
POTASSIUM SERPL-SCNC: 4.1 MMOL/L (ref 3.5–5.2)
PROT SERPL-MCNC: 6.7 G/DL (ref 6–8.5)
SODIUM SERPL-SCNC: 141 MMOL/L (ref 134–144)
TRIGL SERPL-MCNC: 246 MG/DL (ref 0–149)
VLDLC SERPL CALC-MCNC: 39 MG/DL (ref 5–40)

## 2025-09-02 ENCOUNTER — OFFICE VISIT (OUTPATIENT)
Dept: CARDIOLOGY | Facility: CLINIC | Age: 70
End: 2025-09-02
Payer: MEDICARE

## 2025-09-02 VITALS
SYSTOLIC BLOOD PRESSURE: 115 MMHG | WEIGHT: 233.2 LBS | HEART RATE: 101 BPM | DIASTOLIC BLOOD PRESSURE: 75 MMHG | HEIGHT: 70 IN | BODY MASS INDEX: 33.39 KG/M2 | OXYGEN SATURATION: 96 %

## 2025-09-02 DIAGNOSIS — I77.810 THORACIC AORTIC ECTASIA (CMS/HCC): ICD-10-CM

## 2025-09-02 DIAGNOSIS — I49.3 PVC'S (PREMATURE VENTRICULAR CONTRACTIONS): ICD-10-CM

## 2025-09-02 DIAGNOSIS — I25.10 CORONARY ARTERY DISEASE INVOLVING NATIVE CORONARY ARTERY OF NATIVE HEART WITHOUT ANGINA PECTORIS: ICD-10-CM

## 2025-09-02 DIAGNOSIS — I35.0 NONRHEUMATIC AORTIC VALVE STENOSIS: Primary | ICD-10-CM

## 2025-09-02 DIAGNOSIS — I10 PRIMARY HYPERTENSION: ICD-10-CM

## 2025-09-02 DIAGNOSIS — R93.1 ELEVATED CORONARY ARTERY CALCIUM SCORE: ICD-10-CM

## 2025-09-02 DIAGNOSIS — E78.2 MIXED HYPERLIPIDEMIA: ICD-10-CM

## 2025-09-02 DIAGNOSIS — R01.1 MURMUR: ICD-10-CM

## 2025-09-02 LAB
ATRIAL RATE: 101
P AXIS: 59
PR INTERVAL: 188
QRS DURATION: 96
QT INTERVAL: 334
QTC CALCULATION(BAZETT): 433
R AXIS: 48
T WAVE AXIS: -8
VENTRICULAR RATE: 101

## 2025-09-02 PROCEDURE — G2211 COMPLEX E/M VISIT ADD ON: HCPCS | Performed by: INTERNAL MEDICINE

## 2025-09-02 PROCEDURE — G8752 SYS BP LESS 140: HCPCS | Performed by: INTERNAL MEDICINE

## 2025-09-02 PROCEDURE — 93000 ELECTROCARDIOGRAM COMPLETE: CPT | Performed by: INTERNAL MEDICINE

## 2025-09-02 PROCEDURE — G8754 DIAS BP LESS 90: HCPCS | Performed by: INTERNAL MEDICINE

## 2025-09-02 PROCEDURE — 99214 OFFICE O/P EST MOD 30 MIN: CPT | Performed by: INTERNAL MEDICINE

## 2025-09-02 RX ORDER — NAPROXEN SODIUM 220 MG/1
81 TABLET, FILM COATED ORAL NIGHTLY
COMMUNITY